# Patient Record
Sex: FEMALE | Race: BLACK OR AFRICAN AMERICAN | NOT HISPANIC OR LATINO | Employment: FULL TIME | URBAN - METROPOLITAN AREA
[De-identification: names, ages, dates, MRNs, and addresses within clinical notes are randomized per-mention and may not be internally consistent; named-entity substitution may affect disease eponyms.]

---

## 2020-12-31 ENCOUNTER — HOSPITAL ENCOUNTER (EMERGENCY)
Facility: HOSPITAL | Age: 45
Discharge: HOME | End: 2020-12-31
Attending: EMERGENCY MEDICINE | Admitting: EMERGENCY MEDICINE
Payer: COMMERCIAL

## 2020-12-31 VITALS
RESPIRATION RATE: 18 BRPM | DIASTOLIC BLOOD PRESSURE: 76 MMHG | WEIGHT: 234 LBS | SYSTOLIC BLOOD PRESSURE: 131 MMHG | HEIGHT: 65 IN | TEMPERATURE: 98.2 F | BODY MASS INDEX: 38.99 KG/M2 | OXYGEN SATURATION: 100 % | HEART RATE: 82 BPM

## 2020-12-31 DIAGNOSIS — L03.818 CELLULITIS OF OTHER SPECIFIED SITE: Primary | ICD-10-CM

## 2020-12-31 LAB
ALBUMIN SERPL BCP-MCNC: 3.8 G/DL (ref 3.5–5)
ALP SERPL-CCNC: 68 U/L (ref 46–116)
ALT SERPL W P-5'-P-CCNC: 25 U/L (ref 12–78)
ANION GAP SERPL CALCULATED.3IONS-SCNC: 11 MMOL/L (ref 4–13)
AST SERPL W P-5'-P-CCNC: 14 U/L (ref 5–45)
BASOPHILS # BLD AUTO: 0.02 THOUSANDS/ΜL (ref 0–0.1)
BASOPHILS NFR BLD AUTO: 0 % (ref 0–1)
BILIRUB SERPL-MCNC: <0.1 MG/DL (ref 0.2–1)
BUN SERPL-MCNC: 16 MG/DL (ref 5–25)
CALCIUM SERPL-MCNC: 8.9 MG/DL (ref 8.3–10.1)
CHLORIDE SERPL-SCNC: 100 MMOL/L (ref 100–108)
CO2 SERPL-SCNC: 26 MMOL/L (ref 21–32)
CREAT SERPL-MCNC: 1.06 MG/DL (ref 0.6–1.3)
EOSINOPHIL # BLD AUTO: 0.17 THOUSAND/ΜL (ref 0–0.61)
EOSINOPHIL NFR BLD AUTO: 2 % (ref 0–6)
ERYTHROCYTE [DISTWIDTH] IN BLOOD BY AUTOMATED COUNT: 14.2 % (ref 11.6–15.1)
GFR SERPL CREATININE-BSD FRML MDRD: 74 ML/MIN/1.73SQ M
GLUCOSE SERPL-MCNC: 94 MG/DL (ref 65–140)
HCT VFR BLD AUTO: 38.1 % (ref 34.8–46.1)
HGB BLD-MCNC: 12 G/DL (ref 11.5–15.4)
HOLD SPECIMEN: NORMAL
IMM GRANULOCYTES # BLD AUTO: 0.02 THOUSAND/UL (ref 0–0.2)
IMM GRANULOCYTES NFR BLD AUTO: 0 % (ref 0–2)
LYMPHOCYTES # BLD AUTO: 2.08 THOUSANDS/ΜL (ref 0.6–4.47)
LYMPHOCYTES NFR BLD AUTO: 27 % (ref 14–44)
MCH RBC QN AUTO: 26.5 PG (ref 26.8–34.3)
MCHC RBC AUTO-ENTMCNC: 31.5 G/DL (ref 31.4–37.4)
MCV RBC AUTO: 84 FL (ref 82–98)
MONOCYTES # BLD AUTO: 0.42 THOUSAND/ΜL (ref 0.17–1.22)
MONOCYTES NFR BLD AUTO: 5 % (ref 4–12)
NEUTROPHILS # BLD AUTO: 5 THOUSANDS/ΜL (ref 1.85–7.62)
NEUTS SEG NFR BLD AUTO: 66 % (ref 43–75)
NRBC BLD AUTO-RTO: 0 /100 WBCS
PLATELET # BLD AUTO: 351 THOUSANDS/UL (ref 149–390)
PMV BLD AUTO: 9.9 FL (ref 8.9–12.7)
POTASSIUM SERPL-SCNC: 3.7 MMOL/L (ref 3.5–5.3)
PROT SERPL-MCNC: 8 G/DL (ref 6.4–8.2)
RBC # BLD AUTO: 4.52 MILLION/UL (ref 3.81–5.12)
SODIUM SERPL-SCNC: 137 MMOL/L (ref 136–145)
WBC # BLD AUTO: 7.71 THOUSAND/UL (ref 4.31–10.16)

## 2020-12-31 PROCEDURE — 99284 EMERGENCY DEPT VISIT MOD MDM: CPT | Performed by: EMERGENCY MEDICINE

## 2020-12-31 PROCEDURE — 36415 COLL VENOUS BLD VENIPUNCTURE: CPT

## 2020-12-31 PROCEDURE — 99282 EMERGENCY DEPT VISIT SF MDM: CPT

## 2020-12-31 PROCEDURE — 80053 COMPREHEN METABOLIC PANEL: CPT | Performed by: EMERGENCY MEDICINE

## 2020-12-31 PROCEDURE — 85025 COMPLETE CBC W/AUTO DIFF WBC: CPT | Performed by: EMERGENCY MEDICINE

## 2020-12-31 RX ORDER — CEPHALEXIN 500 MG/1
500 CAPSULE ORAL EVERY 6 HOURS SCHEDULED
Qty: 20 CAPSULE | Refills: 0 | Status: SHIPPED | OUTPATIENT
Start: 2020-12-31 | End: 2021-01-05

## 2020-12-31 RX ORDER — CEPHALEXIN 250 MG/1
500 CAPSULE ORAL ONCE
Status: COMPLETED | OUTPATIENT
Start: 2020-12-31 | End: 2020-12-31

## 2020-12-31 RX ADMIN — CEPHALEXIN 500 MG: 250 CAPSULE ORAL at 20:11

## 2021-01-01 NOTE — ED PROVIDER NOTES
History  Chief Complaint   Patient presents with    Abscess     Patient reports right abscess 4 days ago, using 2 hot compresses for 2 days  Some drainage today, with blood  Denies NVD     HPI     45-year-old female presenting for evaluation erythema pain present to the right breast at the 3 o'clock position  She has been applying warm compresses to the area for the last 2 days and states that she noticed a small pustule around a hair follicle yesterday  She squeezed it was able to express a small amount of purulent drainage today  Denies fevers, chills, nausea, or vomiting  Reports pain over the area of erythema  She has had similar episodes in the past, all of which seem to start with erythema surrounding a hair follicle  No known history of MRSA  No nipple drainage  None       History reviewed  No pertinent past medical history  History reviewed  No pertinent surgical history  History reviewed  No pertinent family history  I have reviewed and agree with the history as documented  E-Cigarette/Vaping     E-Cigarette/Vaping Substances     Social History     Tobacco Use    Smoking status: Never Smoker    Smokeless tobacco: Never Used   Substance Use Topics    Alcohol use: No    Drug use: No       Review of Systems   Constitutional: Negative for chills and fever  HENT: Negative for congestion  Eyes: Negative for visual disturbance  Respiratory: Negative for cough and shortness of breath  Cardiovascular: Negative for chest pain and leg swelling  Gastrointestinal: Negative for abdominal pain, nausea and vomiting  Genitourinary: Negative for flank pain  Musculoskeletal: Negative for arthralgias, back pain, neck pain and neck stiffness  Skin: Negative for rash  Erythema to the right breast   Neurological: Negative for weakness, numbness and headaches  Psychiatric/Behavioral: Negative for agitation, behavioral problems and confusion         Physical Exam  Physical Exam  Constitutional:       General: She is not in acute distress  Appearance: She is well-developed  She is not diaphoretic  HENT:      Head: Normocephalic and atraumatic  Right Ear: External ear normal       Left Ear: External ear normal       Nose: Nose normal    Eyes:      Conjunctiva/sclera: Conjunctivae normal    Neck:      Musculoskeletal: Normal range of motion and neck supple  Cardiovascular:      Rate and Rhythm: Normal rate and regular rhythm  Heart sounds: Normal heart sounds  No murmur  No friction rub  No gallop  Pulmonary:      Effort: Pulmonary effort is normal  No respiratory distress  Breath sounds: Normal breath sounds  No wheezing or rales  Comments: Approximately 3 cm area of erythema to the right breast at the 3 o'clock position  No fluctuance or induration  No palpable abscess  No nipple drainage  Breasts symmetric without swelling  No dimpling  No right axillary lymphadenopathy  Abdominal:      General: Bowel sounds are normal  There is no distension  Palpations: Abdomen is soft  Tenderness: There is no abdominal tenderness  There is no guarding  Musculoskeletal: Normal range of motion  General: No deformity  Skin:     General: Skin is warm and dry  Neurological:      Mental Status: She is alert and oriented to person, place, and time  Motor: No abnormal muscle tone           Vital Signs  ED Triage Vitals [12/31/20 1654]   Temperature Pulse Respirations Blood Pressure SpO2   98 2 °F (36 8 °C) 90 16 148/63 98 %      Temp Source Heart Rate Source Patient Position - Orthostatic VS BP Location FiO2 (%)   Oral Monitor Lying Left arm --      Pain Score       8           Vitals:    12/31/20 1654 12/31/20 1912   BP: 148/63 131/76   Pulse: 90 82   Patient Position - Orthostatic VS: Lying Sitting         Visual Acuity      ED Medications  Medications   cephalexin (KEFLEX) capsule 500 mg (500 mg Oral Given 12/31/20 2011)       Diagnostic Studies  Results Reviewed     Procedure Component Value Units Date/Time    Comprehensive metabolic panel [[id number]]  (Abnormal) Collected: 12/31/20 1701    Lab Status: Final result Specimen: Blood from Arm, Right Updated: 12/31/20 1747     Sodium 137 mmol/L      Potassium 3 7 mmol/L      Chloride 100 mmol/L      CO2 26 mmol/L      ANION GAP 11 mmol/L      BUN 16 mg/dL      Creatinine 1 06 mg/dL      Glucose 94 mg/dL      Calcium 8 9 mg/dL      AST 14 U/L      ALT 25 U/L      Alkaline Phosphatase 68 U/L      Total Protein 8 0 g/dL      Albumin 3 8 g/dL      Total Bilirubin <0 10 mg/dL      eGFR 74 ml/min/1 73sq m     Narrative:      National Kidney Disease Foundation guidelines for Chronic Kidney Disease (CKD):     Stage 1 with normal or high GFR (GFR > 90 mL/min/1 73 square meters)    Stage 2 Mild CKD (GFR = 60-89 mL/min/1 73 square meters)    Stage 3A Moderate CKD (GFR = 45-59 mL/min/1 73 square meters)    Stage 3B Moderate CKD (GFR = 30-44 mL/min/1 73 square meters)    Stage 4 Severe CKD (GFR = 15-29 mL/min/1 73 square meters)    Stage 5 End Stage CKD (GFR <15 mL/min/1 73 square meters)  Note: GFR calculation is accurate only with a steady state creatinine    CBC and differential [[id number]]  (Abnormal) Collected: 12/31/20 1701    Lab Status: Final result Specimen: Blood from Arm, Right Updated: 12/31/20 1713     WBC 7 71 Thousand/uL      RBC 4 52 Million/uL      Hemoglobin 12 0 g/dL      Hematocrit 38 1 %      MCV 84 fL      MCH 26 5 pg      MCHC 31 5 g/dL      RDW 14 2 %      MPV 9 9 fL      Platelets 839 Thousands/uL      nRBC 0 /100 WBCs      Neutrophils Relative 66 %      Immat GRANS % 0 %      Lymphocytes Relative 27 %      Monocytes Relative 5 %      Eosinophils Relative 2 %      Basophils Relative 0 %      Neutrophils Absolute 5 00 Thousands/µL      Immature Grans Absolute 0 02 Thousand/uL      Lymphocytes Absolute 2 08 Thousands/µL      Monocytes Absolute 0 42 Thousand/µL      Eosinophils Absolute 0 17 Thousand/µL      Basophils Absolute 0 02 Thousands/µL                  No orders to display              Procedures  Procedures         ED Course                                           MDM  Number of Diagnoses or Management Options  Cellulitis of other specified site: new and requires workup  Diagnosis management comments: Exam consistent with cellulitis to the right breast   Possibly started from an ingrown hair as erythema surrounds a hair follicle  No palpable abscess  A bedside ultrasound performed without evidence of abscess underlying this cellulitis  Will start treatment with Keflex  No evidence of systemic infection  Patient counseled about the importance of follow-up with her doctor in 1 week to ensure resolution  Discussed recommendation for mammogram to rule out underlying malignancy  Return precautions discussed  Amount and/or Complexity of Data Reviewed  Clinical lab tests: reviewed    Patient Progress  Patient progress: stable        Disposition  Final diagnoses:   Cellulitis of other specified site     Time reflects when diagnosis was documented in both MDM as applicable and the Disposition within this note     Time User Action Codes Description Comment    12/31/2020  7:40 PM Mary Alcantar Add [V01 132] Cellulitis of other specified site       ED Disposition     ED Disposition Condition Date/Time Comment    Discharge Stable u Dec 31, 2020  7:39 PM Natalie Gant discharge to home/self care  Follow-up Information     Follow up With Specialties Details Why Contact Info Additional Information    Your primary care doctor  In 1 week Please follow-up with your doctor for breast exam and recheck in 1 week to ensure resolution of your symptoms  Your doctor may want to order a mammogram for further evaluation        [facility] Emergency Medicine  As we discussed, return to the Emergency Department for increased redness to your breast, fever, swelling, severe pain, or new or concerning symptoms  [address] [facility] [address] [phone] [facility], [address], [place], [place], [address]          Discharge Medication List as of 12/31/2020  7:41 PM      START taking these medications    Details   cephalexin (KEFLEX) 500 mg capsule Take 1 capsule (500 mg total) by mouth every 6 (six) hours for 5 days, Starting Thu 12/31/2020, Until Tue 1/5/2021, Normal           No discharge procedures on file      PDMP Review     None          ED Provider  Electronically Signed by           Camilla Atkins MD  12/31/20 9792

## 2023-02-14 ENCOUNTER — APPOINTMENT (OUTPATIENT)
Dept: URGENT CARE | Facility: MEDICAL CENTER | Age: 48
End: 2023-02-14

## 2023-03-01 ENCOUNTER — APPOINTMENT (OUTPATIENT)
Dept: URGENT CARE | Facility: MEDICAL CENTER | Age: 48
End: 2023-03-01

## 2023-03-20 ENCOUNTER — OFFICE VISIT (OUTPATIENT)
Dept: OBGYN CLINIC | Facility: HOSPITAL | Age: 48
End: 2023-03-20

## 2023-03-20 ENCOUNTER — TELEPHONE (OUTPATIENT)
Dept: OBGYN CLINIC | Facility: HOSPITAL | Age: 48
End: 2023-03-20

## 2023-03-20 VITALS
BODY MASS INDEX: 39.49 KG/M2 | HEART RATE: 82 BPM | SYSTOLIC BLOOD PRESSURE: 121 MMHG | WEIGHT: 237 LBS | HEIGHT: 65 IN | DIASTOLIC BLOOD PRESSURE: 76 MMHG

## 2023-03-20 DIAGNOSIS — S83.412A SPRAIN OF MEDIAL COLLATERAL LIGAMENT OF LEFT KNEE, INITIAL ENCOUNTER: Primary | ICD-10-CM

## 2023-03-20 DIAGNOSIS — M19.019 AC JOINT ARTHROPATHY: ICD-10-CM

## 2023-03-20 DIAGNOSIS — M25.512 ACUTE PAIN OF LEFT SHOULDER: ICD-10-CM

## 2023-03-20 RX ORDER — NAPROXEN 500 MG/1
500 TABLET ORAL 2 TIMES DAILY WITH MEALS
Qty: 60 TABLET | Refills: 5 | Status: SHIPPED | OUTPATIENT
Start: 2023-03-20

## 2023-03-20 RX ORDER — NAPROXEN 500 MG/1
TABLET ORAL
COMMUNITY
Start: 2023-03-05

## 2023-03-20 NOTE — TELEPHONE ENCOUNTER
Caller: patient     Doctor: Yonis Boucehr    Reason for call: patient is going to need a new script for naproxen-500 mg-2x a day  Patient has about 5 left   Patient uses the [facility] on [address]    Call back#: [phone]

## 2023-03-20 NOTE — TELEPHONE ENCOUNTER
Caller: patient  Doctor: Yara Kowalski    Reason for call: patient is a  and needs a work note stating she's on light duty with the following restrictions:    Can't lift arm above shoulder level  No kneeling  Wear knee brace with activity  No hard grasping with left hand     Patient can retrieve through [address]     Call back#: [phone]

## 2023-03-20 NOTE — PROGRESS NOTES
Assessment:   Diagnosis ICD-10-CM Associated Orders   1  Sprain of medial collateral ligament of left knee, initial encounter  S83 412A Ambulatory Referral to Physical Therapy      2  AC joint arthropathy  M19 019 Ambulatory Referral to Physical Therapy      3  Acute pain of left shoulder  M25 512 Ambulatory Referral to Physical Therapy          Plan:  • MRIs of the left knee and shoulder were reviewed with the patient in the office  • On exam her pain does correlate with what is seen on the MRI  • At this time is recommended to continue with the hinged knee brace, icing and start physical therapy  • A cortisone [facility] was [facility], but the patient had declined today  Recommended to start therapy for the shoulder as well  • She should continue using the naproxen 500 mg as prescribed and can add extra strength Tylenol in between if need be  • Continue with the current work restriction placed by [facility]    To do next visit:  Return in about 4 weeks (around 4/17/2023) for Left shoulder and knee  The above stated was discussed in layman's terms and the patient expressed understanding  All questions were answered to the patient's satisfaction  Scribe Attestation    I,:  Calvin Koroma am acting as a scribe while in the presence of the attending physician :       I,:  Sherri Camarena MD personally performed the services described in this documentation    as scribed in my presence :             Subjective:   Pola Palacio is a 53 y o  female who presents today for consultation for her left knee and left shoulder injury referred by [facility]  Patient reports that she had a work-related injury on 1/23/2023, where the patient was 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 when she felt a sharp pain in the shoulder, she then stepped down off the bump felt the left knee collapse medially  She feels popping in the knee   She reports that she has sharp shooting pain on the medial aspect of the left knee and is worse with weightbearing  Patient has tried Tylenol, cold and naproxen for her pain and discomfort  Sharp pain on the top if the shoulder with difficulty reaching across the body  An MRI of the left shoulder was obtained on 2/23/2023 that will be reviewed today in the office  Review of systems negative unless otherwise specified in HPI  Review of Systems   Constitutional: Negative for chills, fever and unexpected weight change  HENT: Negative for hearing loss, nosebleeds and sore throat  Eyes: Negative for pain, redness and visual disturbance  Respiratory: Negative for cough, shortness of breath and wheezing  Cardiovascular: Negative for chest pain, palpitations and leg swelling  Gastrointestinal: Negative for abdominal pain and nausea  Genitourinary: Negative for dyspareunia, dysuria and frequency  Musculoskeletal: Positive for joint swelling  Skin: Negative for rash and wound  Neurological: Negative for dizziness, numbness and headaches  Psychiatric/Behavioral: Negative for decreased concentration and suicidal ideas  The patient is not nervous/anxious  History reviewed  No pertinent past medical history  History reviewed  No pertinent surgical history  History reviewed  No pertinent family history      Social History     Occupational History   • Not on file   Tobacco Use   • Smoking status: Never   • Smokeless tobacco: Never   Substance and Sexual Activity   • Alcohol use: No   • Drug use: No   • Sexual activity: Not on file         Current Outpatient Medications:   •  naproxen (NAPROSYN) 500 mg tablet, TAKE 1 TABLET BY MOUTH TWICE A DAY AS NEEDED FOR PAIN/INFLAMMATION (TAKE WITH FOOD), Disp: , Rfl:   •  Diclofenac Sodium (VOLTAREN) 1 %, Voltaren 1 % topical gel  Apply 2 gram to affected area(s) by topical route 4 times per day (Patient not taking: Reported on 3/20/2023), Disp: , Rfl:     No Known Allergies Vitals:    03/20/23 1209   BP: 121/76   Pulse: 82       Objective:                    Left Knee Exam     Tenderness   The patient is experiencing tenderness in the MCL (lateral head of gastroc)  Range of Motion   Extension: 10 (PROM 0 with pain)   Flexion: 120 (With pain)     Tests   Varus: negative Valgus: positive (with pain)  Drawer:  Anterior - negative         Other   Erythema: absent  Sensation: normal  Pulse: present  Swelling: none  Effusion: no effusion present    Comments:  Calf is soft and non tender      Left Shoulder Exam     Tenderness   The patient is experiencing tenderness in the acromioclavicular joint  Range of Motion   Forward flexion: 150 (with pain)   Internal rotation 0 degrees: Sacrum     Muscle Strength   The patient has normal left shoulder strength  Tests   Cross arm: positive  Impingement: positive    Other   Erythema: absent  Sensation: normal  Pulse: present             Diagnostics, reviewed and taken today if performed as documented:    None performed      The attending physician has personally reviewed the pertinent films in PACS and interpretation is as follows:  MRI of the left shoulder reviewed from 2/23/2023 performed at [facility] diagnostic imaging: Severe chronic acromioclavicular arthropathy, though the age of the reactive edema is difficult to confirm, age indeterminate labral tear  MRI left knee reviewed from 2/23/2023 performed at [facility] diagnostic imaging: Acute/recent medial collateral ligament sprain and ruptured/inflamed Baker's cyst   No effusion  Procedures, if performed today:    Procedures    None performed      Portions of the record may have been created with voice recognition software  Occasional wrong word or "sound a like" substitutions may have occurred due to the inherent limitations of voice recognition software  Read the chart carefully and recognize, using context, where substitutions have occurred

## 2023-03-21 ENCOUNTER — TELEPHONE (OUTPATIENT)
Dept: OBGYN CLINIC | Facility: CLINIC | Age: 48
End: 2023-03-21

## 2023-03-29 ENCOUNTER — EVALUATION (OUTPATIENT)
Dept: PHYSICAL THERAPY | Facility: CLINIC | Age: 48
End: 2023-03-29

## 2023-03-29 DIAGNOSIS — M25.512 ACUTE PAIN OF LEFT SHOULDER: ICD-10-CM

## 2023-03-29 DIAGNOSIS — S83.412A SPRAIN OF MEDIAL COLLATERAL LIGAMENT OF LEFT KNEE, INITIAL ENCOUNTER: ICD-10-CM

## 2023-03-29 DIAGNOSIS — M19.019 AC JOINT ARTHROPATHY: ICD-10-CM

## 2023-03-29 NOTE — PROGRESS NOTES
PT Evaluation     Today's date: 3/29/2023  Patient name: Emma Mir  : 1975  MRN: [id number]  Referring provider: Bia Gaitan MD  Dx:   Encounter Diagnosis     ICD-10-CM    1  Sprain of medial collateral ligament of left knee, initial encounter  S83 412A Ambulatory Referral to Physical Therapy      2  AC joint arthropathy  M19 019 Ambulatory Referral to Physical Therapy      3  Acute pain of left shoulder  M25 512 Ambulatory Referral to Physical Therapy          Start Time: 830  Stop Time: 935  Total time in clinic (min): 65 minutes    Assessment  Assessment details: Pt is a 53y o  year old female presenting to physical therapy for Sprain of medial collateral ligament of left knee, initial encounter, AC joint arthropathy, and Acute pain of left shoulder  She presents with the following impairments: decreased L knee flexion ROM, poor L quad activation, decreased L knee strength, decreased L shoulder ROM, decreased L shoulder strength, hypersensitivity of L shoulder with activity, + L AC joint testing, and + L MCL testing affecting her function with walking, squatting, lifting, working, driving, overhead activities, dressing, cleaning, showering, and going to the gym  Pt will benefit from skilled physical therapy to address functional limitations noted in evaluation and meet patient goals  Impairments: abnormal muscle firing, abnormal or restricted ROM, activity intolerance, impaired physical strength, lacks appropriate home exercise program, pain with function and poor body mechanics    Symptom irritability: moderateBarriers to therapy: Worker's compensation  Understanding of Dx/Px/POC: good   Prognosis: good    Goals  ST  Pt will be independent with HEP  2  Pt will improve L shoulder flexion ROM by 15 degrees  3  Pt will improve L knee flexion ROM by 15 degrees  4  Pt will improve L quad activation to good  5  Pt will improve L shoulder pain at rest to 4/10  LT   Pt will "improve L shoulder pain at rest to 0/10   2  Pt will demonstrate L shoulder ROM WNL to improve ability to perform ADLs  3  Pt will improve L knee extension strength to 4+/5 to improve stability with walking  4  Pt will improve L shoulder ER strength to 4/5 to improve ability to carry objects  5  Pt will improve L shoulder abduction strength to 4/5 to improve ability to lift overhead  Plan  Patient would benefit from: PT eval and skilled physical therapy  Planned modality interventions: biofeedback, manual electrical stimulation, microcurrent electrical stimulation, TENS, electrical stimulation/Russian stimulation, thermotherapy: hydrocollator packs, cryotherapy and unattended electrical stimulation  Planned therapy interventions: abdominal trunk stabilization, joint mobilization, manual therapy, massage, ADL retraining, neuromuscular re-education, body mechanics training, patient education, postural training, strengthening, stretching, therapeutic activities, therapeutic exercise, flexibility, functional ROM exercises, home exercise program, balance and balance/weight bearing training  Frequency: 2x week  Duration in weeks: 8  Treatment plan discussed with: patient        Subjective Evaluation    History of Present Illness  Mechanism of injury: Pt presents to clinic with history of L knee MCL sprain and L shoulder pain that started after an injury at work on 1/23 where she was opening a truck container and it was stuck but she heard her L shoulder \"snap\" and then when she stepped off she felt her knee buckle  Pt stated that this is a worker's compensation case  Pt stated that she cannot lay on her L side without pain  Pt stated that she has a lot of pain when she reaches her arm across her body and overhead  Pt stated that her L knee pain has improved since the injury and the swelling has gone down  Pt stated she ices the knee everyday and it helps with the pain   Pt stated she takes naproxen for her pain, it " helps with the shoulder but not with the knee  Pt stated that she has had some pain ever since her appointment with her doctor after performing different knee special tests  Pt says that she has a popping sound of the L knee that makes the knee feel better  Pt stated that she has some difficulty performing ADLs at home such as sweeping, dressing- putting her bra on, and  showering  Quality of life: good    Pain  Current pain ratin  At best pain ratin  Quality: dull ache, discomfort, sharp and pulling          Objective     Palpation   Left   Tenderness of the rectus femoris and vastus medialis  Right   No palpable tenderness to the rectus femoris and vastus medialis  Tenderness     Left Shoulder   Tenderness in the [facility] joint and acromion  No tenderness in the biceps tendon (proximal), bicipital groove and supraspinatus tendon  Right Shoulder  No tenderness in the [facility] joint, acromion, biceps tendon (proximal), bicipital groove and supraspinatus tendon  Left Knee   Tenderness in the MCL (distal), MCL (proximal), medial joint line, patellar tendon and quadriceps tendon  No tenderness in the lateral joint line, lateral retinaculum, LCL (distal) and LCL (proximal)  Right Knee   No tenderness in the lateral joint line, LCL (distal), LCL (proximal), MCL (distal), MCL (proximal), medial joint line, patellar tendon and quadriceps tendon       Active Range of Motion   Cervical/Thoracic Spine       Cervical    Flexion:  WFL  Extension:  Restriction level: minimal  Left lateral flexion:  WFL  Right lateral flexion:  with pain Restriction level minimal  Left rotation:  WFL  Right rotation:  [facility]  Left Shoulder   Flexion: 115 degrees with pain  Abduction: 100 degrees with pain  External rotation 45°: 60 degrees   Internal rotation 45°: 85 degrees     Right Shoulder   Flexion: 175 degrees   Abduction: 170 degrees   External rotation 45°: 80 degrees   Internal rotation 45°: 90 degrees   Left Knee   Flexion: "120 degrees   Extension: 0 degrees     Right Knee   Flexion: 140 degrees   Extension: 0 degrees     Passive Range of Motion   Left Shoulder   Flexion: 120 degrees with pain  Abduction: 105 degrees with pain    Mobility   Patellar Mobility:   Left Knee   WFL: medial and lateral    Hypomobile: left superior and left inferior    Right Knee   WFL: medial, lateral, superior and inferior    Patellar Mobility Comments   Left medial tendon comments: pain  Strength/Myotome Testing     Left Shoulder     Planes of Motion   Flexion: 3+   Abduction: 3+   External rotation at 0°: 3   Internal rotation at 0°: 3+     Right Shoulder     Planes of Motion   Flexion: 4+   Abduction: 4+   External rotation at 0°: 4   Internal rotation at 0°: 4+     Left Knee   Flexion: 4+  Extension: 3+  Quadriceps contraction: poor    Right Knee   Flexion: 5  Extension: 5  Quadriceps contraction: good    Tests     Left Shoulder   Positive AC shear, apprehension, crossover, empty can, full can, Hawkin's, painful arc and ULTT1  Negative drop arm  Right Shoulder   Negative AC shear, apprehension, crossover, drop arm, empty can, full can, Hawkin's, painful arc and ULTT1  Left Knee   Positive medial Zeyad, patellar apprehension, valgus stress test at 0 degrees and valgus stress test at 30 degrees  Negative lateral Zeyad, varus stress test at 0 degrees and varus stress test at 30 degrees  Additional Tests Details  Painful arc past 90 degrees of abduction on L side  Assess single leg balance next visit  Precautions:  Worker's comp    Date 3/29            Visit # IE            FOTO 26/53             Re-eval IE              Manuals 3/29            L knee PROM              L shoulder PROM                                        Neuro Re-Ed 3/29            No monies 5x GTB            Quad sets 10x5\"            bridges             clamshells 10x GTB            SLS             Ball circles             Push up plus           " Ther Ex 3/29            Bike             UBE/pulleys             Leg press             Finger ladder             Wall slides             Shoulder raises             scap punches             Rows/exts 10x GTB rows            ER/IR             sidestepping             S/l hip abd             UT str                                        Ther Activity 3/29            squats             Step ups             Gait Training                                       Modalities 3/29            ice prn

## 2023-04-03 ENCOUNTER — OFFICE VISIT (OUTPATIENT)
Dept: PHYSICAL THERAPY | Facility: CLINIC | Age: 48
End: 2023-04-03

## 2023-04-03 DIAGNOSIS — S83.412A SPRAIN OF MEDIAL COLLATERAL LIGAMENT OF LEFT KNEE, INITIAL ENCOUNTER: Primary | ICD-10-CM

## 2023-04-03 DIAGNOSIS — M19.019 AC JOINT ARTHROPATHY: ICD-10-CM

## 2023-04-03 DIAGNOSIS — M25.512 ACUTE PAIN OF LEFT SHOULDER: ICD-10-CM

## 2023-04-03 NOTE — PROGRESS NOTES
"Daily Note     Today's date: 4/3/2023  Patient name: Marie Baker  : 1975  MRN: [id number]  Referring provider: Madhavi Walsh MD  Dx:   Encounter Diagnosis     ICD-10-CM    1  Sprain of medial collateral ligament of left knee, initial encounter  S83 412A       2  AC joint arthropathy  M19 019       3  Acute pain of left shoulder  M25 512           Start Time: 1150  Stop Time: 1300  Total time in clinic (min): 70 minutes    Subjective: Pt stated that the day after the IE she was very sore, but is feeling ok today  Pt said her R shoulder and neck feel tight today  Pt stated it was hard to complete some of the LE strengthening exercises due to pain in her back when laying on her back  Objective: See treatment diary below      Assessment: Pt tolerated warm up on pulleys well but had mild discomfort towards end range flexion of RUE; pt reported decrease in tightness post activity  Pt tolerated manual L shoulder PROM with UT stretch and L knee PROM with reported decrease in stiffness post manual treatment  Pt tolerated supine LE and shoulder strengthening exercises but required mild verbal cueing and demonstration to perform with proper form  Pt performed L shoulder mobility and strengthening exercises well but had mild to moderate discomfort during exercises  Pt would benefit from continued skilled PT to improve L shoulder mobility, strength, scapular stability, L knee strength, quad activation, mobility, and functional ability  Plan: Continue per plan of care  Progress treatment as tolerated  Precautions:  Worker's comp    Date 3/29 4/3           Visit # IE 2           FOTO              Re-eval IE              Manuals 3/29 4/3           L knee PROM   DK           L shoulder PROM   DK                                     Neuro Re-Ed 3/29 4/3           No monies 5x GTB 15x GTB           Quad sets 10x5\" 20x5\"           bridges  2x10            clamshells 10x GTB            SLS           " "  Ball circles  30x f/b           Push up plus             Ther Ex 3/29 4/3           Bike             UBE/pulleys  5' pulley           Leg press  15x LLE 45#           Finger ladder  10x5\" flex           Wall slides             Shoulder raises             Shoulder AAROM  10x5\" flex, sup baton           scap punches  2x10 2#           Rows/exts 10x GTB rows            ER/IR  2x10 3#           sidestepping             S/l hip abd             UT str   5x15\" for L side           Hamstring str  5x10\" supine           TKE             Ther Activity 3/29 4/3           squats             Step ups             Gait Training                                       Modalities 3/29 4/3           ice prn                              "

## 2023-04-05 ENCOUNTER — OFFICE VISIT (OUTPATIENT)
Dept: PHYSICAL THERAPY | Facility: CLINIC | Age: 48
End: 2023-04-05

## 2023-04-05 DIAGNOSIS — M25.512 ACUTE PAIN OF LEFT SHOULDER: ICD-10-CM

## 2023-04-05 DIAGNOSIS — S83.412A SPRAIN OF MEDIAL COLLATERAL LIGAMENT OF LEFT KNEE, INITIAL ENCOUNTER: Primary | ICD-10-CM

## 2023-04-05 DIAGNOSIS — M19.019 AC JOINT ARTHROPATHY: ICD-10-CM

## 2023-04-05 NOTE — PROGRESS NOTES
"Daily Note     Today's date: 2023  Patient name: Don Lawrence  : 1975  MRN: [id number]  Referring provider: Kristen Uriarte MD  Dx:   Encounter Diagnosis     ICD-10-CM    1  Sprain of medial collateral ligament of left knee, initial encounter  S83 412A       2  AC joint arthropathy  M19 019       3  Acute pain of left shoulder  M25 512                      Subjective: Pt presents to PT reporting increased pain in L shoulder secondary to mopping and sweeping the whole house yesterday and work up sore this morning  Pt reports minimal pain in L knee today  Pt denies increased pain post PT session  She reports positive response to CP  Objective: See treatment diary below      Assessment: Pt demonstrates poor tolerance to TE today secondary to L shoulder pain  Unable to tolerate manual therapy with increased guarding despite cuing  Added table slides to achieve ROM with minimal pain with MHP to L shoulder  Pt educated on when to use heat and cold; safety and timing  Pt reports a verbal understanding  Patient demonstrated fatigue post treatment, exhibited good technique with therapeutic exercises and would benefit from continued PT to increase flexibility, strength and function  Plan: Continue per plan of care  Precautions: Worker's comp    Date 3/29 4/3 4/5          Visit # IE 2 3          FOTO              Re-eval IE              Manuals 3/29 4/3 4/5          L knee PROM   DK np          L shoulder PROM   DK np PN!                                     Neuro Re-Ed 3/29 4/3 4/5          No monies 5x GTB 15x GTB           Quad sets 10x5\" 20x5\"           bridges  2x10            clamshells 10x GTB            SLS             Unilateral stretch @ door way   10\" x 5          Table slides   10\" x 10 flex/IR          Ball circles  30x f/b           Push up plus             Ther Ex 3/29 4/3 4/5          Bike             UBE/pulleys  5' pulley 5' pulley          Leg press  15x LLE 45# " "2x10 LLE 45#          Finger ladder  10x5\" flex 10x5\" flex          Wall slides             Shoulder raises             Shoulder AAROM  10x5\" flex, sup baton           scap punches  2x10 2#           Rows/exts 10x GTB rows            ER/IR  2x10 3#           sidestepping             S/l hip abd             UT str   5x15\" for L side           Hamstring str  5x10\" supine           TKE             Ther Activity 3/29 4/3 4/5          squats             Step ups             Gait Training   4/5                                    Modalities 3/29 4/3 4/5          ice prn  10' L shoulder          MHP   c TE 10 mins               "

## 2023-04-25 ENCOUNTER — OFFICE VISIT (OUTPATIENT)
Dept: PHYSICAL THERAPY | Facility: CLINIC | Age: 48
End: 2023-04-25

## 2023-04-25 DIAGNOSIS — S83.412A SPRAIN OF MEDIAL COLLATERAL LIGAMENT OF LEFT KNEE, INITIAL ENCOUNTER: Primary | ICD-10-CM

## 2023-04-25 DIAGNOSIS — M25.512 ACUTE PAIN OF LEFT SHOULDER: ICD-10-CM

## 2023-04-25 DIAGNOSIS — M19.019 AC JOINT ARTHROPATHY: ICD-10-CM

## 2023-04-25 NOTE — PROGRESS NOTES
Daily Note     Today's date: 2023  Patient name: Don Lawrence  : 1975  MRN: [id number]  Referring provider: Kristen Uriarte MD  Dx:   Encounter Diagnosis     ICD-10-CM    1  Sprain of medial collateral ligament of left knee, initial encounter  S83 412A       2  Acute pain of left shoulder  M25 512       3  AC joint arthropathy  M19 019           Start Time: 08  Stop Time: 905  Total time in clinic (min): 65 minutes    Subjective: Pt stated that she did not sleep very well last night due to pain in her L shoulder that shot down her whole arm  Pt also stated that her knee was in a lot of pain last night after climbing stairs and moving stuff around in preparation for her to move to a new house  Objective: See treatment diary below      Assessment: Pt tolerated warm up on pulleys well, requiring one rest break due to mild discomfort in her L shoulder during activity  Pt could not perform full set of resisted L shoulder IR due to moderate discomfort with activity  Pt tolerated addition of new L knee strengthening exercises well and would benefit from continued progression of exercises focusing on L quad activation  Pt tolerated mobility L shoulder mobility exercises well but had mild discomfort post each exercise  Pt tolerated application of ice on L shoulder and knee at end of session well with reported decrease in discomfort post modality  Pt would benefit from continued skilled PT to improve L knee strength, mobility, L shoulder strength, ROM, and functional ability  Plan: Progress note during next visit  Progress treatment as tolerated  Precautions: Worker's comp    Date 3/29 4/3 4/5 4/11 4/14 4/18 4/21 4/25     Visit # IE 2 3 4 5 6 7 8     FOTO              Re-eval IE              Manuals 3/29 4/3 4/5 4/11 4/14 4/18 4/21 4/25     L knee PROM   DK np  DK Quad/HS str  DK quad/hs str      L shoulder PROM   DK np PN!  DK w grade I-II mobs  DK w gr I-II       LLE STM     DK w "roller  DK w roller                   Neuro Re-Ed 3/29 4/3 4/5 4/11 4/14 4/18 4/21 4/25     No monies 5x GTB 15x GTB  5x GTB**  10x PTB  2x10 PTB     Quad sets 10x5\" 20x5\"     15x5\"      bridges  2x10      2x10      clamshells 10x GTB            SLS             Unilateral stretch @ door way   10\" x 5 5x15\" BUE    5x15\" BUE 5x20\"     Table slides   10\" x 10 flex/IR 10x10\" flex         Ball circles  30x f/b           Supine knee ext w leg hanging       10x       LAQ       10x 2x10     Push up plus             Ther Ex 3/29 4/3 4/5 4/11 4/14 4/18 4/21 4/25     Bike     6'        UBE/pulleys  5' pulley 5' pulley 5' pull  5' pull  5' pull     Leg press  15x LLE 45# 2x10 LLE 45#    2x10 LLE 45# 2x10 LLE 55#     Finger ladder  10x5\" flex 10x5\" flex 10x10\" flex  10x10\" flex 10x10\" flex 10x10\" flex     Wall slides             Shoulder raises             Shoulder AAROM  10x5\" flex, sup baton  2x10 5\" sup  2x10 5\"       scap punches  2x10 2#    2x10 3#       Rows/exts 10x GTB rows       2x10 10#     ER/IR  2x10 3#    2x10 3#  2x10 3# ER, IR 5x**     sidestepping             S/l hip abd             UT str   5x15\" for L side           Hamstring str  5x10\" supine   5x15\" supine        Prone LLE hang     3x30\"         TKE        2x15 10#                  Ther Activity 3/29 4/3 4/5     4/25     squats        2x10      Step ups        2x10 0R     Gait Training   4/5                                    Modalities 3/29 4/3 4/5  4/14 4/18 4/21 4/25     ice prn  10' L shoulder  8' ice 8' ice post L shoulder 8' ice post L knee 8' ice post L knee, L shoulder     MHP   c TE 10 mins 10' post              "

## 2023-04-28 ENCOUNTER — OFFICE VISIT (OUTPATIENT)
Dept: PHYSICAL THERAPY | Facility: CLINIC | Age: 48
End: 2023-04-28

## 2023-04-28 DIAGNOSIS — M19.019 AC JOINT ARTHROPATHY: ICD-10-CM

## 2023-04-28 DIAGNOSIS — M25.512 ACUTE PAIN OF LEFT SHOULDER: ICD-10-CM

## 2023-04-28 DIAGNOSIS — S83.412A SPRAIN OF MEDIAL COLLATERAL LIGAMENT OF LEFT KNEE, INITIAL ENCOUNTER: Primary | ICD-10-CM

## 2023-04-28 NOTE — PROGRESS NOTES
Daily Note     Today's date: 2023  Patient name: Geneva Jovel  : 1975  MRN: [id number]  Referring provider: Alaina De La Cruz MD  Dx:   Encounter Diagnosis     ICD-10-CM    1  Sprain of medial collateral ligament of left knee, initial encounter  S83 412A       2  Acute pain of left shoulder  M25 512       3  AC joint arthropathy  M19 019           Start Time: 08  Stop Time: 57  Total time in clinic (min): 65 minutes    Subjective: Pt stated that her knee is bothering her today and the shoulder pain is manageable  Pt stated that she is moving into a new house tomorrow night is a little nervous about how her knee and shoulder are going to be  Objective: See treatment diary below      Assessment: Pt tolerated warm up on bike well and able to perform without increase in pain during or after activity  Pt session today focused on knee extension mobility, quad activation, and stretching posterior and lateral structures of LLE, which pt tolerated with mild discomfort but demonstrated improved ability to extend knee with decreased pain  Pt tolerated manual STM with roller to HS and ITB as well as knee extension PROM with overpressure to facilitate improved motion with reported decrease in stiffness post manual treatment  Pt was challenged appropriately with SAQ exercise and showed improved quad activation with activity, progress resistance as tolerated next session  Pt tolerated application of ice  of session well with reported decrease in discomfort post modality  Pt would benefit from continued skilled PT to improve LLE mobility, strength, weight bearing tolerance, endurance, quad activation, L shoulder strength, ROM, and functional ability  Plan: Continue per plan of care  Progress treatment as tolerated  Precautions:  Worker's comp    Date 3/29 4/3 4/5 4/11 4/14 4/18 4/21 4/25 4/28    Visit # IE 2 3 4 5 6 7 8 9    FOTO              Re-eval IE              Manuals 3/29 4/3 4/5 4/11 "4/14 4/18 4/21 4/25 4/28    L knee PROM   DK np  DK Quad/HS str  DK quad/hs str  DK knee exts w overpressure    L shoulder PROM   DK np PN!  DK w grade I-II mobs  DK w gr I-II       LLE STM     DK w roller  DK w roller                   Neuro Re-Ed 3/29 4/3 4/5 4/11 4/14 4/18 4/21 4/25 4/28    No monies 5x GTB 15x GTB  5x GTB**  10x PTB  2x10 PTB     Quad sets 10x5\" 20x5\"     15x5\"  20x5\"    bridges  2x10      2x10      clamshells 10x GTB            SLS             Unilateral stretch @ door way   10\" x 5 5x15\" BUE    5x15\" BUE 5x20\"     Table slides   10\" x 10 flex/IR 10x10\" flex         Ball circles  30x f/b           Supine knee ext w leg hanging       10x       SAQ         20x5\"    LAQ       10x 2x10     Push up plus             Ther Ex 3/29 4/3 4/5 4/11 4/14 4/18 4/21 4/25 4/28    Bike     6'    6'    UBE/pulleys  5' pulley 5' pulley 5' pull  5' pull  5' pull     Leg press  15x LLE 45# 2x10 LLE 45#    2x10 LLE 45# 2x10 LLE 55# 2x10 LLE 65#    Finger ladder  10x5\" flex 10x5\" flex 10x10\" flex  10x10\" flex 10x10\" flex 10x10\" flex     Wall slides             Shoulder raises             Shoulder AAROM  10x5\" flex, sup baton  2x10 5\" sup  2x10 5\"       scap punches  2x10 2#    2x10 3#       Rows/exts 10x GTB rows       2x10 10#     ER/IR  2x10 3#    2x10 3#  2x10 3# ER, IR 5x**     sidestepping             S/l hip abd             UT str   5x15\" for L side           Hamstring str  5x10\" supine   5x15\" supine    5x20\" sup    Prone LLE hang     3x30\"         TKE        2x15 10# 2x15 10#    Seated knee ext hold         2x1' on chair    Ther Activity 3/29 4/3 4/5     4/25     squats        2x10      Step ups        2x10 0R     Gait Training   4/5                                    Modalities 3/29 4/3 4/5  4/14 4/18 4/21 4/25 4/28    ice prn  10' L shoulder  8' ice 8' ice post L shoulder 8' ice post L knee 8' ice post L knee, L shoulder 8' ice post L knee, L shoul    MHP   c TE 10 mins 10' post              "

## 2023-05-02 ENCOUNTER — APPOINTMENT (OUTPATIENT)
Dept: PHYSICAL THERAPY | Facility: CLINIC | Age: 48
End: 2023-05-02
Payer: OTHER MISCELLANEOUS

## 2023-05-03 ENCOUNTER — OFFICE VISIT (OUTPATIENT)
Dept: PHYSICAL THERAPY | Facility: CLINIC | Age: 48
End: 2023-05-03

## 2023-05-03 DIAGNOSIS — S83.412A SPRAIN OF MEDIAL COLLATERAL LIGAMENT OF LEFT KNEE, INITIAL ENCOUNTER: Primary | ICD-10-CM

## 2023-05-03 DIAGNOSIS — M25.512 ACUTE PAIN OF LEFT SHOULDER: ICD-10-CM

## 2023-05-03 DIAGNOSIS — M19.019 AC JOINT ARTHROPATHY: ICD-10-CM

## 2023-05-03 NOTE — PROGRESS NOTES
Daily Note     Today's date: 5/3/2023  Patient name: Anita Wong  : 1975  MRN: [id number]  Referring provider: Leandro Cheng MD  Dx:   Encounter Diagnosis     ICD-10-CM    1  Sprain of medial collateral ligament of left knee, initial encounter  S83 412A       2  Acute pain of left shoulder  M25 512       3  AC joint arthropathy  M19 019           Start Time: 0900  Stop Time: 09  Total time in clinic (min): 45 minutes    Subjective: Pt stated that she moved into her new house this past weekend and she was on her feet most of the weekend and has a lot of pain in her L knee today  Pt stated her shoulder is feeling ok today and would like to work on stretching out the knee  Objective: See treatment diary below      Assessment: Pt session focused on manual stretching and PROM as pt was in a lot of pain throughout session  Pt tolerated manual L knee PROM into extension with quad stretch, quad STM, and hamstring stretch with reported decrease in stiffness post manual treatment  Pt continues to demonstrate moderate to maximal tightness of L hamstring which is prohibiting full knee extension ROM and causing her moderate discomfort on the posterior aspect of the knee, pt stated that supine stretch for the hamstring has helped a little bit with her discomfort and demonstrates slight improvement in ROM with continued repetitions  Continue to work on proper quad activation technique as pt still has moderate deficit compared to RLE  Pt tolerated application of ice at end of session well with reported decrease in discomfort post modality  Pt would benefit from continued skilled PT to improve L quad activation, knee extension ROM, LLE mobility, LUE strength, ROM, and functional ability  Plan: Continue per plan of care  Progress treatment as tolerated  Focus on shoulder NV  Precautions:  Worker's comp    Date 3/29 4/3 4/5 4/11 4/14 4/18 4/21 4/25 4/28 5/3   Visit # IE 2 3 4 5 6 7 8 9 10   FOTO  "           Re-eval IE              Manuals 3/29 4/3 4/5 4/11 4/14 4/18 4/21 4/25 4/28 5/3   L knee PROM   DK np  DK Quad/HS str  DK quad/hs str  DK knee exts w overpressure DK L knee exts w overpressure, quad str, HS str   L shoulder PROM   DK np PN!  DK w grade I-II mobs  DK w gr I-II       LLE STM     DK w roller  DK w roller   DK w roller                Neuro Re-Ed 3/29 4/3 4/5 4/11 4/14 4/18 4/21 4/25 4/28 5/3   No monies 5x GTB 15x GTB  5x GTB**  10x PTB  2x10 PTB     Quad sets 10x5\" 20x5\"     15x5\"  20x5\" 20x5\"   bridges  2x10      2x10      clamshells 10x GTB            SLS             Unilateral stretch @ door way   10\" x 5 5x15\" BUE    5x15\" BUE 5x20\"     Table slides   10\" x 10 flex/IR 10x10\" flex         Ball circles  30x f/b           Supine knee ext w leg hanging       10x       SAQ         20x5\" 20x5\"   LAQ       10x 2x10  2x10   Push up plus             Ther Ex 3/29 4/3 4/5 4/11 4/14 4/18 4/21 4/25 4/28 5/3   Bike     6'    6' nv   UBE/pulleys  5' pulley 5' pulley 5' pull  5' pull  5' pull     Leg press  15x LLE 45# 2x10 LLE 45#    2x10 LLE 45# 2x10 LLE 55# 2x10 LLE 65# 3x10 65# LLE   Finger ladder  10x5\" flex 10x5\" flex 10x10\" flex  10x10\" flex 10x10\" flex 10x10\" flex     Wall slides             Shoulder raises             Shoulder AAROM  10x5\" flex, sup baton  2x10 5\" sup  2x10 5\"       scap punches  2x10 2#    2x10 3#       Rows/exts 10x GTB rows       2x10 10#     ER/IR  2x10 3#    2x10 3#  2x10 3# ER, IR 5x**     sidestepping             S/l hip abd             UT str   5x15\" for L side           Hamstring str  5x10\" supine   5x15\" supine    5x20\" sup 5x20\" sup   Prone LLE hang     3x30\"         TKE        2x15 10# 2x15 10#    Seated knee ext hold         2x1' on chair 3x1' chair   Ther Activity 3/29 4/3 4/5     4/25     squats        2x10      Step ups        2x10 0R     Gait Training   4/5                                    Modalities 3/29 4/3 4/5  4/14 4/18 4/21 4/25 4/28 5/3   ice prn  10' L " shoulder  8' ice 8' ice post L shoulder 8' ice post L knee 8' ice post L knee, L shoulder 8' ice post L knee, L shoul 8' ice post L knee, L shoulder   MHP   c TE 10 mins 10' post

## 2023-05-05 ENCOUNTER — APPOINTMENT (OUTPATIENT)
Dept: PHYSICAL THERAPY | Facility: CLINIC | Age: 48
End: 2023-05-05
Payer: OTHER MISCELLANEOUS

## 2023-05-05 ENCOUNTER — OFFICE VISIT (OUTPATIENT)
Dept: PHYSICAL THERAPY | Facility: CLINIC | Age: 48
End: 2023-05-05

## 2023-05-05 DIAGNOSIS — S83.412A SPRAIN OF MEDIAL COLLATERAL LIGAMENT OF LEFT KNEE, INITIAL ENCOUNTER: Primary | ICD-10-CM

## 2023-05-05 DIAGNOSIS — M19.019 AC JOINT ARTHROPATHY: ICD-10-CM

## 2023-05-05 DIAGNOSIS — M25.512 ACUTE PAIN OF LEFT SHOULDER: ICD-10-CM

## 2023-05-05 NOTE — PROGRESS NOTES
Daily Note     Today's date: 2023  Patient name: Roslyn Florian  : 1975  MRN: [id number]  Referring provider: Laura Rick MD  Dx:   Encounter Diagnosis     ICD-10-CM    1  Sprain of medial collateral ligament of left knee, initial encounter  S83 412A       2  Acute pain of left shoulder  M25 512       3  AC joint arthropathy  M19 019           Start Time: 99  Stop Time: 93  Total time in clinic (min): 63 minutes    Subjective: Pt stated that her knee is feeling a lot better today, although she is still having some tightness on the top of the knee  Pt stated that her shoulder is feeling ok but she slept on it last night and is feeling a little sore  Objective: See treatment diary below      Assessment: Pt tolerated warm up on pulleys well at beginning of session with observable improvement in L shoulder flexion ROM and only required one rest break during activity due to mild discomfort  Pt tolerated manual L shoulder PROM with reported decrease in stiffness post manual treatment- pt demonstrated improved shoulder flexion, abduction, and ER PROM today with mild discomfort above 120 degrees  Pt tolerated L shoulder strengthening exercises well today with mild discomfort and fatigue, continue to progress activities as tolerated  Pt performed L shoulder mobility exercises well with observable improvement in ROM  Pt showed improved tolerance to L knee extension activities as well with decreased pain  Pt would benefit from continued skilled PT to improve LUE strength, mobility, ROM, LLE strength, endurance, and functional ability  Plan: Continue per plan of care  Progress treatment as tolerated  Precautions:  Worker's comp    Date 5/5   4/11 4/14 4/18 4/21 4/25 4/28 5/3   Visit # 11   4 5 6 7 8 9 10   FOTO              Re-eval               Manuals 5/5   4/11 4/14 4/18 4/21 4/25 4/28 5/3   L knee PROM      DK Quad/HS str  DK quad/hs str  DK knee exts w overpressure DK L knee exts w "overpressure, quad str, HS str   L shoulder PROM  DK   DK w grade I-II mobs  DK w gr I-II       LLE STM     DK w roller  DK w roller   DK w roller                Neuro Re-Ed 5/5   4/11 4/14 4/18 4/21 4/25 4/28 5/3   No monies 2x10 GTB   5x GTB**  10x PTB  2x10 PTB     Quad sets       15x5\"  20x5\" 20x5\"   bridges       2x10      clamshells             SLS             Unilateral stretch @ door way 5x15\" BUE   5x15\" BUE    5x15\" BUE 5x20\"     Table slides    10x10\" flex         Ball circles 30x f/b GMB            Supine knee ext w leg hanging       10x       SAQ 20x5\"        20x5\" 20x5\"   LAQ 20x3\"      10x 2x10  2x10   Push up plus             Ther Ex 5/5 4/11 4/14 4/18 4/21 4/25 4/28 5/3   Bike     6'    6' nv   UBE/pulleys 5' pull   5' pull  5' pull  5' pull     Leg press       2x10 LLE 45# 2x10 LLE 55# 2x10 LLE 65# 3x10 65# LLE   Finger ladder 10x10\" abd   10x10\" flex  10x10\" flex 10x10\" flex 10x10\" flex     Wall slides             Shoulder raises 2x10 2# flex, 1# abd            Shoulder AAROM    2x10 5\" sup  2x10 5\"       scap punches      2x10 3#       Rows/exts        2x10 10#     S/l ER 2x10 2#            ER/IR      2x10 3#  2x10 3# ER, IR 5x**     sidestepping             S/l hip abd             UT str              Hamstring str     5x15\" supine    5x20\" sup 5x20\" sup   Prone LLE hang     3x30\"         TKE        2x15 10# 2x15 10#    Seated knee ext hold         2x1' on chair 3x1' chair   Ther Activity        4/25     squats        2x10      Step ups        2x10 0R     Gait Training                                       Modalities 5/5 4/14 4/18 4/21 4/25 4/28 5/3   ice 8' ice post     8' ice 8' ice post L shoulder 8' ice post L knee 8' ice post L knee, L shoulder 8' ice post L knee, L shoul 8' ice post L knee, L shoulder   MHP    10' post              "

## 2023-05-09 ENCOUNTER — TELEPHONE (OUTPATIENT)
Dept: OBGYN CLINIC | Facility: HOSPITAL | Age: 48
End: 2023-05-09

## 2023-05-09 ENCOUNTER — APPOINTMENT (OUTPATIENT)
Dept: PHYSICAL THERAPY | Facility: CLINIC | Age: 48
End: 2023-05-09
Payer: OTHER MISCELLANEOUS

## 2023-05-09 NOTE — TELEPHONE ENCOUNTER
Caller: Minus Holiday    Doctor: James Orozco     Reason for call: Has alight duty work note, but since she is a  the only thing that she can do on light duty is sit at a desk all day, and this is very uncomfortable. She is asking for a new work note listing restrictions such as can only sit so long at a time, or a call back to discuss further.     Call back#: [phone]

## 2023-05-11 ENCOUNTER — OFFICE VISIT (OUTPATIENT)
Dept: PHYSICAL THERAPY | Facility: CLINIC | Age: 48
End: 2023-05-11

## 2023-05-11 DIAGNOSIS — S83.412A SPRAIN OF MEDIAL COLLATERAL LIGAMENT OF LEFT KNEE, INITIAL ENCOUNTER: Primary | ICD-10-CM

## 2023-05-11 DIAGNOSIS — M25.512 ACUTE PAIN OF LEFT SHOULDER: ICD-10-CM

## 2023-05-11 DIAGNOSIS — M19.019 AC JOINT ARTHROPATHY: ICD-10-CM

## 2023-05-11 NOTE — PROGRESS NOTES
"Daily Note     Today's date: 2023  Patient name: Irlanda Odell  : 1975  MRN: [id number]  Referring provider: Florecita Cohen MD  Dx:   Encounter Diagnosis     ICD-10-CM    1  Sprain of medial collateral ligament of left knee, initial encounter  S83 412A       2  Acute pain of left shoulder  M25 512       3  AC joint arthropathy  M19 019           Start Time: 71  Stop Time: 09  Total time in clinic (min): 35 minutes    Subjective: Pt began working 40 hours a week again where she is sitting most of the day  Pt stated that her shoulder is in a lot of pain today since Tuesday where she fell asleep in the car and slept on her L shoulder, when she woke up her shoulder was in severe pain  Pt stated she told her doctor about that pain on Tuesday via phone call  Pt stated she wanted to work just on her shoulder today and to do, \"light stuff  \"       Objective: See treatment diary below      Assessment: Pt had mild to moderate discomfort with pulley activity and could not finish full five minutes due to pain  Pt performed L shoulder stretching and mobility activities well with mild discomfort during performance  Pt was able to complete ER strengthening exercises but required decreased resistance since last visit to complete with proper form  Pt tolerated manual L shoulder PROM with reported decrease in stiffness post manual treatment  Pt tolerated application of ice at end of session well with reported decrease in discomfort post modality  Pt would benefit from continued skilled PT to improve L shoulder strength, ROM, mobility, endurance, L knee strength, quad activation, flexibility, functional ability  Plan: Continue per plan of care  Progress treatment as tolerated  Precautions:  Worker's comp    Date  5/3   Visit # 11 12  4 5 6 7 8 9 10   FOTO              Re-eval               Manuals 5/5 5/11  4/11 4/14 4/18 4/21 4/25 4/28 5/3   L knee PROM      DK " "Quad/HS str  DK quad/hs str  DK knee exts w overpressure DK L knee exts w overpressure, quad str, HS str   L shoulder PROM  DK DK  DK w grade I-II mobs  DK w gr I-II       LLE STM     DK w roller  DK w roller   DK w roller                Neuro Re-Ed 5/5 5/11 4/11 4/14 4/18 4/21 4/25 4/28 5/3   No monies 2x10 GTB 2x10 PTB  5x GTB**  10x PTB  2x10 PTB     Quad sets       15x5\"  20x5\" 20x5\"   bridges       2x10      clamshells             SLS             Unilateral stretch @ door way 5x15\" BUE 5x15\" BUE  5x15\" BUE    5x15\" BUE 5x20\"     Table slides    10x10\" flex         Ball circles 30x f/b GMB            Supine knee ext w leg hanging       10x       SAQ 20x5\"        20x5\" 20x5\"   LAQ 20x3\"      10x 2x10  2x10   Push up plus             Ther Ex 5/5 5/11 4/11 4/14 4/18 4/21 4/25 4/28 5/3   Bike     6'    6' nv   UBE/pulleys 5' pull 4' pull  5' pull  5' pull  5' pull     Leg press       2x10 LLE 45# 2x10 LLE 55# 2x10 LLE 65# 3x10 65# LLE   Finger ladder 10x10\" abd 10x10\" abd  10x10\" flex  10x10\" flex 10x10\" flex 10x10\" flex     Wall slides             Shoulder raises 2x10 2# flex, 1# abd            Shoulder AAROM  2x10 5\" sup baton  2x10 5\" sup  2x10 5\"       scap punches      2x10 3#       Rows/exts        2x10 10#     S/l ER 2x10 2#            ER/IR  2x10 2# ER    2x10 3#  2x10 3# ER, IR 5x**     sidestepping             S/l hip abd             UT str              Hamstring str     5x15\" supine    5x20\" sup 5x20\" sup   Prone LLE hang     3x30\"         TKE        2x15 10# 2x15 10#    Seated knee ext hold         2x1' on chair 3x1' chair   Ther Activity        4/25     squats        2x10      Step ups        2x10 0R     Gait Training                                       Modalities 5/5 5/11   4/14 4/18 4/21 4/25 4/28 5/3   ice 8' ice post  8; post shoulder   8' ice 8' ice post L shoulder 8' ice post L knee 8' ice post L knee, L shoulder 8' ice post L knee, L shoul 8' ice post L knee, L shoulder   MHP    10' post      "

## 2023-05-12 ENCOUNTER — OFFICE VISIT (OUTPATIENT)
Dept: PHYSICAL THERAPY | Facility: CLINIC | Age: 48
End: 2023-05-12

## 2023-05-12 DIAGNOSIS — S83.412A SPRAIN OF MEDIAL COLLATERAL LIGAMENT OF LEFT KNEE, INITIAL ENCOUNTER: Primary | ICD-10-CM

## 2023-05-12 DIAGNOSIS — M25.512 ACUTE PAIN OF LEFT SHOULDER: ICD-10-CM

## 2023-05-12 DIAGNOSIS — M19.019 AC JOINT ARTHROPATHY: ICD-10-CM

## 2023-05-12 NOTE — PROGRESS NOTES
"Daily Note     Today's date: 2023  Patient name: Gregor Bliss  : 1975  MRN: [id number]  Referring provider: Jeanne Leal MD  Dx:   Encounter Diagnosis     ICD-10-CM    1  Sprain of medial collateral ligament of left knee, initial encounter  S83 412A       2  Acute pain of left shoulder  M25 512       3  AC joint arthropathy  M19 019           Start Time:   Stop Time: 192  Total time in clinic (min): 40 minutes    Subjective: Pt stated that she has been working 8 hour shifts and going in early so she is very tired today and still feels a lot of pain and stiffness in her L shoulder, her knee still feels tight but is walking around much better at home  Objective: See treatment diary below      Assessment: Pt tolerated gentle L shoulder mobility activities well today with mild discomfort during performance  Pt tolerated very slight progression of resistance with standing ER at [facility] but required mild verbal cueing to perform with proper form  Pt tolerated manual L shoulder PROM with reported decrease in stiffness post manual treatment  Pt tolerated application of ice at end of session well with reported decrease in discomfort post modality  Pt would benefit from continued skilled PT to improve L shoulder mobility, strength, L knee strength, mobility, quad activation, and functional ability  Plan: Continue per plan of care  Progress treatment as tolerated  Precautions:  Worker's comp    Date 5/5 5/11 5/12    4/21 4/25 4/28 5/3   Visit # 11 12 13    7 8 9 10   FOTO             Re-eval               Manuals  5/3   L knee PROM        DK quad/hs str  DK knee exts w overpressure DK L knee exts w overpressure, quad str, HS str   L shoulder PROM  DK DK DK          LLE STM       DK w roller   DK w roller                Neuro Re-Ed 5/5 5/11 5/12    4/21 4/25 4/28 5/3   No monies 2x10 GTB 2x10 PTB 2x10 PTB     2x10 PTB     Quad sets       15x5\"  " "20x5\" 20x5\"   bridges       2x10      clamshells             SLS             Unilateral stretch @ door way 5x15\" BUE 5x15\" BUE 5x15\" BUE    5x15\" BUE 5x20\"     Table slides   10x5\" flex          Ball circles 30x f/b GMB            Supine knee ext w leg hanging       10x       SAQ 20x5\"        20x5\" 20x5\"   LAQ 20x3\"      10x 2x10  2x10   Push up plus             Ther Ex 5/5 5/11 5/12 4/21 4/25 4/28 5/3   Bike         6' nv   UBE/pulleys 5' pull 4' pull      5' pull     Leg press       2x10 LLE 45# 2x10 LLE 55# 2x10 LLE 65# 3x10 65# LLE   Finger ladder 10x10\" abd 10x10\" abd     10x10\" flex 10x10\" flex     Wall slides             Shoulder raises 2x10 2# flex, 1# abd            Shoulder AAROM  2x10 5\" sup baton 2x10 5\" stand abd          scap punches             Rows/exts        2x10 10#     S/l ER 2x10 2#            ER/IR  2x10 2# ER 25x 2 5# ER     2x10 3# ER, IR 5x**     sidestepping             S/l hip abd             UT str              Hamstring str         5x20\" sup 5x20\" sup   Prone LLE hang             TKE        2x15 10# 2x15 10#    Seated knee ext hold         2x1' on chair 3x1' chair   Ther Activity        4/25     squats        2x10      Step ups        2x10 0R     Gait Training                                       Modalities 5/5 5/11 5/12 4/21 4/25 4/28 5/3   ice 8' ice post  8; post shoulder 8' post shoulder    8' ice post L knee 8' ice post L knee, L shoulder 8' ice post L knee, L shoul 8' ice post L knee, L shoulder   MHP                  "

## 2023-05-16 ENCOUNTER — OFFICE VISIT (OUTPATIENT)
Dept: PHYSICAL THERAPY | Facility: CLINIC | Age: 48
End: 2023-05-16

## 2023-05-16 DIAGNOSIS — M19.019 AC JOINT ARTHROPATHY: ICD-10-CM

## 2023-05-16 DIAGNOSIS — S83.412A SPRAIN OF MEDIAL COLLATERAL LIGAMENT OF LEFT KNEE, INITIAL ENCOUNTER: Primary | ICD-10-CM

## 2023-05-16 DIAGNOSIS — M25.512 ACUTE PAIN OF LEFT SHOULDER: ICD-10-CM

## 2023-05-16 NOTE — PROGRESS NOTES
Daily Note     Today's date: 2023  Patient name: Chelsi Klein  : 1975  MRN: [id number]  Referring provider: Shannan Chahal MD  Dx:   Encounter Diagnosis     ICD-10-CM    1  Sprain of medial collateral ligament of left knee, initial encounter  S83 412A       2  Acute pain of left shoulder  M25 512       3  AC joint arthropathy  M19 019           Start Time: 0800  Stop Time: 0840  Total time in clinic (min): 40 minutes    Subjective: Pt stated that her shoulder is in a lot of pain today, she has not been sleeping well the past few nights because of it  Pt stated that her knee feels like it is pretty stiff today but is feeling better overall than it has  Pt stated she wanted it to take it easy today  Objective: See treatment diary below      Assessment: Pt tolerated warm up on pulleys but had mild discomfort during activity  Pt tolerated manual L knee PROM and L shoulder PROM with reported decrease in stiffness post manual treatment but mild discomfort during manual treatment in the posterior knee and anterior shoulder  Pt had increased discomfort with L shoulder strengthening exercises and gentle mobility exercises when coming down during flexion activities  Pt would benefit from continued skilled PT to improve L knee strength, L shoulder strength, L shoulder ROM, L knee ROM, and functional ability  Plan: Continue per plan of care  Progress treatment as tolerated  Precautions:  Worker's comp    Date  53   Visit # 11 12 13 14   7 8 9 10   FOTO             Re-eval               Manuals  53   L knee PROM     DK   DK quad/hs str  DK knee exts w overpressure DK L knee exts w overpressure, quad str, HS str   L shoulder PROM  DK DK DK DK         LLE STM       DK w roller   DK w roller                Neuro Re-Ed  53   No monies 2x10 GTB 2x10 PTB 2x10 PTB 2x10 PTB    2x10 PTB     Quad "sets       15x5\"  20x5\" 20x5\"   bridges       2x10      clamshells             SLS             Unilateral stretch @ door way 5x15\" BUE 5x15\" BUE 5x15\" BUE 5x20\" BUE   5x15\" BUE 5x20\"     Table slides   10x5\" flex 10x5\" flex         Ball circles 30x f/b GMB            Supine knee ext w leg hanging       10x       SAQ 20x5\"        20x5\" 20x5\"   LAQ 20x3\"      10x 2x10  2x10   Push up plus             Ther Ex 5/5 5/11 5/12 5/16   4/21 4/25 4/28 5/3   Bike         6' nv   UBE/pulleys 5' pull 4' pull  5' pull    5' pull     Leg press       2x10 LLE 45# 2x10 LLE 55# 2x10 LLE 65# 3x10 65# LLE   Finger ladder 10x10\" abd 10x10\" abd     10x10\" flex 10x10\" flex     Wall slides             Shoulder raises 2x10 2# flex, 1# abd            Shoulder AAROM  2x10 5\" sup baton 2x10 5\" stand abd 2x10 supine baton         scap punches             Rows/exts        2x10 10#     S/l ER 2x10 2#            ER/IR  2x10 2# ER 25x 2 5# ER     2x10 3# ER, IR 5x**     sidestepping             S/l hip abd             UT str              Hamstring str         5x20\" sup 5x20\" sup   Prone LLE hang             TKE        2x15 10# 2x15 10#    Seated knee ext hold         2x1' on chair 3x1' chair   Ther Activity        4/25     squats        2x10      Step ups        2x10 0R     Gait Training                                       Modalities 5/5 5/11 5/12 5/16 4/21 4/25 4/28 5/3   ice 8' ice post  8; post shoulder 8' post shoulder 8' post shoulder/knee   8' ice post L knee 8' ice post L knee, L shoulder 8' ice post L knee, L shoul 8' ice post L knee, L shoulder   MHP                  "

## 2023-05-25 ENCOUNTER — OFFICE VISIT (OUTPATIENT)
Dept: PHYSICAL THERAPY | Facility: CLINIC | Age: 48
End: 2023-05-25

## 2023-05-25 DIAGNOSIS — M25.512 ACUTE PAIN OF LEFT SHOULDER: ICD-10-CM

## 2023-05-25 DIAGNOSIS — M19.019 AC JOINT ARTHROPATHY: ICD-10-CM

## 2023-05-25 DIAGNOSIS — S83.412A SPRAIN OF MEDIAL COLLATERAL LIGAMENT OF LEFT KNEE, INITIAL ENCOUNTER: Primary | ICD-10-CM

## 2023-05-25 NOTE — PROGRESS NOTES
Daily Note     Today's date: 2023  Patient name: Chrissy Jesus  : 1975  MRN: [id number]  Referring provider: Yelitza Hendrickson MD  Dx:   Encounter Diagnosis     ICD-10-CM    1  Sprain of medial collateral ligament of left knee, initial encounter  S83 412A       2  Acute pain of left shoulder  M25 512       3  AC joint arthropathy  M19 019           Start Time: 0900  Stop Time: 1005  Total time in clinic (min): 65 minutes    Subjective: Pt stated that her knee is feeling pretty good, but it still gets stiff sometimes and has had difficulty with higher steps to get into her truck  Pt stated that her shoulder is difficult and feels weak when she tries to do some activities overhead  Pt stated that the thing that has really been bothering her is her R ankle and stated it is hard to walk on it  Objective: See treatment diary below      Assessment: Pt had mild discomfort with warm up on pulleys but reported slight decrease in stiffness post activity  Pt had mild difficulty with resisted abduction exercises and required decreased weight compared to resisted shoulder flexion activities  Pt tolerated shoulder mobility exercises well but continues to have mild discomfort when moving towards and past 90 degrees of flexion and abduction  Pt shows good improvement of L quad activation and LLE mobility and would benefit from continued focus on extension strength and hamstring flexibility  Pt tolerated application of ice at end of session well with reported decrease in discomfort post modality  Pt would benefit from continued skilled PT to improve L shoulder strength, ROM, mobility, LLE strength, quad activation, and functional ability  Plan: Continue per plan of care  Progress treatment as tolerated  Precautions:  Worker's comp    Date 5/5 5/11 5/12 5/16 5/25  4/21 4/25 4/28 5/3   Visit # 90 35 40 89 38  6 5 5 42   YPCX 55/55            Re-eval               Manuals  "4/25 4/28 5/3   L knee PROM     DK   DK quad/hs str  DK knee exts w overpressure DK L knee exts w overpressure, quad str, HS str   L shoulder PROM  DK DK DK DK         LLE STM       DK w roller   DK w roller                Neuro Re-Ed 5/5 5/11 5/12 5/16 5/25  4/21 4/25 4/28 5/3   No monies 2x10 GTB 2x10 PTB 2x10 PTB 2x10 PTB 2x10 PTB   2x10 PTB     Quad sets       15x5\"  20x5\" 20x5\"   bridges       2x10      clamshells             SLS             Unilateral stretch @ door way 5x15\" BUE 5x15\" BUE 5x15\" BUE 5x20\" BUE 5x20\" BUE  5x15\" BUE 5x20\"     Table slides   10x5\" flex 10x5\" flex         Ball circles 30x f/b GMB            Supine knee ext w leg hanging       10x       SAQ 20x5\"        20x5\" 20x5\"   LAQ 20x3\"      10x 2x10  2x10   Push up plus             Ther Ex 5/5 5/11 5/12 5/16 5/25  4/21 4/25 4/28 5/3   Bike         6' nv   UBE/pulleys 5' pull 4' pull  5' pull 5' pull   5' pull     Leg press       2x10 LLE 45# 2x10 LLE 55# 2x10 LLE 65# 3x10 65# LLE   Finger ladder 10x10\" abd 10x10\" abd   10x10\" abd  10x10\" flex 10x10\" flex     Wall slides             Shoulder raises 2x10 2# flex, 1# abd    10x 3# flex, 2# abd        Shoulder AAROM  2x10 5\" sup baton 2x10 5\" stand abd 2x10 supine baton 10x stand 3# baton        scap punches     15x 3#        Rows/exts        2x10 10#     S/l ER 2x10 2#            ER/IR  2x10 2# ER 25x 2 5# ER  2x10 3# ER   2x10 3# ER, IR 5x**     sidestepping             S/l hip abd             UT str              Hamstring str         5x20\" sup 5x20\" sup   Prone LLE hang             TKE      2x15 12#   2x15 10# 2x15 10#    Seated knee ext hold         2x1' on chair 3x1' chair   Ther Activity     5/25 4/25     squats        2x10      Step ups     15x 2R, 15x 3R L up   2x10 0R     Gait Training                                       Modalities 5/5 5/11 5/12 5/16 5/25  4/21 4/25 4/28 5/3   ice 8' ice post  8; post shoulder 8' post shoulder 8' post shoulder/knee 8' post shoulder   8' ice post L " knee 8' ice post L knee, L shoulder 8' ice post L knee, L shoul 8' ice post L knee, L shoulder   P

## 2023-05-30 ENCOUNTER — OFFICE VISIT (OUTPATIENT)
Dept: PHYSICAL THERAPY | Facility: CLINIC | Age: 48
End: 2023-05-30

## 2023-05-30 DIAGNOSIS — M25.512 ACUTE PAIN OF LEFT SHOULDER: ICD-10-CM

## 2023-05-30 DIAGNOSIS — S83.412A SPRAIN OF MEDIAL COLLATERAL LIGAMENT OF LEFT KNEE, INITIAL ENCOUNTER: Primary | ICD-10-CM

## 2023-05-30 DIAGNOSIS — M19.019 AC JOINT ARTHROPATHY: ICD-10-CM

## 2023-05-30 NOTE — PROGRESS NOTES
Daily Note     Today's date: 2023  Patient name: Dirk Salcedo  : 1975  MRN: [id number]  Referring provider: Ghazal White MD  Dx:   Encounter Diagnosis     ICD-10-CM    1  Sprain of medial collateral ligament of left knee, initial encounter  S83 412A       2  Acute pain of left shoulder  M25 512       3  AC joint arthropathy  M19 019           Start Time: 830  Stop Time: 920  Total time in clinic (min): 50 minutes    Subjective: Pt stated that her L shoulder has been really bothering her over the weekend due to needing to do a lot of things with her family for the holiday  She stated that her knee is feeling stiff but overall is better than it was  Objective: See treatment diary below      Assessment: Pt tolerated manual L shoulder and L knee PROM with reported decrease in stiffness of knee post manual treatment but no change in L shoulder stiffness and had mild discomfort post manual treatment  Discussed with pt that the knee has seen some good benefit from PT but there has not been much functional progress with the shoulder because of the amount of pain experienced with motion to 90 degrees, recommended discussion with doctor next week in terms of different options for pain management in order to better tolerate strengthening activities  Pt tolerated application of ice at end of session well with reported decrease in discomfort post modality  Pt would benefit from continued skilled PT to improve quad activation, hamstrings flexibility, LUE mobility, strength, and functional ability  Plan: Continue per plan of care  Progress treatment as tolerated  Precautions:  Worker's comp    Date 5/5 5/11 5/12 5/16 5/25 5/30    5/3   Visit # 07 79 53 33 49 54    94   WLIM             Re-eval               Manuals 5/5 5/11 5/12 5/16 5/25 5/30    5/3   L knee PROM     DK  DK    DK L knee exts w overpressure, quad str, HS str   L shoulder PROM  DK DK DK DK  DK       LLE STM "DK w roller                Neuro Re-Ed 5/5 5/11 5/12 5/16 5/25 5/30    5/3   No monies 2x10 GTB 2x10 PTB 2x10 PTB 2x10 PTB 2x10 PTB 2x10 PTB       Quad sets          20x5\"   bridges             clamshells             SLS             Unilateral stretch @ door way 5x15\" BUE 5x15\" BUE 5x15\" BUE 5x20\" BUE 5x20\" BUE        Table slides   10x5\" flex 10x5\" flex         Ball circles 30x f/b GMB     30x f/b RMB       Supine knee ext w leg hanging             SAQ 20x5\"     20x5\"    20x5\"   LAQ 20x3\"         2x10   Push up plus             Ther Ex 5/5 5/11 5/12 5/16 5/25 5/30    5/3   Bike          nv   UBE/pulleys 5' pull 4' pull  5' pull 5' pull        Leg press          3x10 65# LLE   Finger ladder 10x10\" abd 10x10\" abd   10x10\" abd        Wall slides             Shoulder raises 2x10 2# flex, 1# abd    10x 3# flex, 2# abd        Shoulder AAROM  2x10 5\" sup baton 2x10 5\" stand abd 2x10 supine baton 10x stand 3# baton 15x sup baton       scap punches     15x 3#        Rows/exts             S/l ER 2x10 2#            ER/IR  2x10 2# ER 25x 2 5# ER  2x10 3# ER 2x15 BTB ER       sidestepping             S/l hip abd             UT str              Hamstring str          5x20\" sup   Prone LLE hang             TKE      2x15 12#        Seated knee ext hold          3x1' chair   Ther Activity     5/25        squats             Step ups     15x 2R, 15x 3R L up        Gait Training                                       Modalities 5/5 5/11 5/12 5/16 5/25 5/30    5/3   ice 8' ice post  8; post shoulder 8' post shoulder 8' post shoulder/knee 8' post shoulder  8' post shoulder/knee    8' ice post L knee, L shoulder   MHP                  "

## 2023-06-01 ENCOUNTER — OFFICE VISIT (OUTPATIENT)
Dept: PHYSICAL THERAPY | Facility: CLINIC | Age: 48
End: 2023-06-01

## 2023-06-01 DIAGNOSIS — M25.512 ACUTE PAIN OF LEFT SHOULDER: ICD-10-CM

## 2023-06-01 DIAGNOSIS — M19.019 AC JOINT ARTHROPATHY: ICD-10-CM

## 2023-06-01 DIAGNOSIS — S83.412A SPRAIN OF MEDIAL COLLATERAL LIGAMENT OF LEFT KNEE, INITIAL ENCOUNTER: Primary | ICD-10-CM

## 2023-06-01 NOTE — PROGRESS NOTES
Daily Note     Today's date: 2023  Patient name: Supa Villa  : 1975  MRN: [id number]  Referring provider: Robert Becker MD  Dx:   Encounter Diagnosis     ICD-10-CM    1  Sprain of medial collateral ligament of left knee, initial encounter  S83 412A       2  Acute pain of left shoulder  M25 512       3  AC joint arthropathy  M19 019           Start Time: 830  Stop Time: 566  Total time in clinic (min): 35 minutes    Subjective: Pt stated her knee has been pretty stiff for the past couple days and her shoulder has not changed much, stated she would like to work more on her knee today  Objective: See treatment diary below      Assessment: Pt showed good improvement in L knee extension strength and quad activation during exercises throughout session  Pt also showed improved tolerance of knee extension activities with decreased stiffness compared to last visit but continues to have increased L hamstring tightness that causes the pt discomfort with prolonged extension stretches and exercises  Pt tolerated pulley activity for L shoulder but had mild discomfort during and after activity  Pt tolerated application of ice at end of session well with reported decrease in discomfort post modality  Pt would benefit from continued skilled PT to improve L shoulder mobility, strength, ROM, L knee strength, quad activation, mobility, and functional ability  Plan: Continue per plan of care  Progress treatment as tolerated  Precautions:  Worker's comp    Date 5/5 5/11 5/12 5/16 5/25 5/30 6/1   5/3   Visit # 42 20 14 73 33 52 96   04   XKOS             Re-eval               Manuals 5/5 5/11 5/12 5/16 5/25 5/30 6/1   5/3   L knee PROM     DK  DK    DK L knee exts w overpressure, quad str, HS str   L shoulder PROM  DK DK DK DK  DK       LLE STM          DK w roller                Neuro Re-Ed 5/5 5/11 5/12 5/16 5/25 5/30 6/1   5/3   No monies 2x10 GTB 2x10 PTB 2x10 PTB 2x10 PTB 2x10 PTB 2x10 PTB "     Quad sets          20x5\"   bridges             clamshells             SLS             Unilateral stretch @ door way 5x15\" BUE 5x15\" BUE 5x15\" BUE 5x20\" BUE 5x20\" BUE        Table slides   10x5\" flex 10x5\" flex         Ball circles 30x f/b GMB     30x f/b RMB       Supine knee ext w leg hanging             SAQ 20x5\"     20x5\" 20x5\" 2#   20x5\"   LAQ 20x3\"      20x5\" 2#   2x10   Push up plus             Ther Ex 5/5 5/11 5/12 5/16 5/25 5/30 6/1   5/3   Bike          nv   UBE/pulleys 5' pull 4' pull  5' pull 5' pull  4' pull      Leg press       3x10 75# LLE   3x10 65# LLE   Finger ladder 10x10\" abd 10x10\" abd   10x10\" abd        Wall slides             Shoulder raises 2x10 2# flex, 1# abd    10x 3# flex, 2# abd        Shoulder AAROM  2x10 5\" sup baton 2x10 5\" stand abd 2x10 supine baton 10x stand 3# baton 15x sup baton       scap punches     15x 3#        Rows/exts             S/l ER 2x10 2#            ER/IR  2x10 2# ER 25x 2 5# ER  2x10 3# ER 2x15 BTB ER       sidestepping             S/l hip abd             UT str              Hamstring str       5x20\" supine   5x20\" sup   Prone LLE hang             TKE      2x15 12#  2x15 15#      Seated knee ext hold       2x20 5\" chair with quad sets   3x1' chair   Ther Activity     5/25        squats             Step ups     15x 2R, 15x 3R L up        Gait Training                                       Modalities 5/5 5/11 5/12 5/16 5/25 5/30 6/1   5/3   ice 8' ice post  8; post shoulder 8' post shoulder 8' post shoulder/knee 8' post shoulder  8' post shoulder/knee 8' post shoulder/knee   8' ice post L knee, L shoulder   MHP                  "

## 2023-06-05 ENCOUNTER — OFFICE VISIT (OUTPATIENT)
Dept: OBGYN CLINIC | Facility: HOSPITAL | Age: 48
End: 2023-06-05
Payer: OTHER MISCELLANEOUS

## 2023-06-05 VITALS
BODY MASS INDEX: 38.89 KG/M2 | HEART RATE: 99 BPM | WEIGHT: 233.4 LBS | SYSTOLIC BLOOD PRESSURE: 129 MMHG | HEIGHT: 65 IN | DIASTOLIC BLOOD PRESSURE: 83 MMHG

## 2023-06-05 DIAGNOSIS — S83.412D SPRAIN OF MEDIAL COLLATERAL LIGAMENT OF LEFT KNEE, SUBSEQUENT ENCOUNTER: ICD-10-CM

## 2023-06-05 DIAGNOSIS — M25.871 MASS OF JOINT OF RIGHT FOOT: Primary | ICD-10-CM

## 2023-06-05 DIAGNOSIS — M19.011 ARTHRITIS OF RIGHT ACROMIOCLAVICULAR JOINT: ICD-10-CM

## 2023-06-05 PROBLEM — S83.412A MCL SPRAIN OF LEFT KNEE: Status: ACTIVE | Noted: 2023-06-05

## 2023-06-05 PROCEDURE — 20610 DRAIN/INJ JOINT/BURSA W/O US: CPT | Performed by: ORTHOPAEDIC SURGERY

## 2023-06-05 PROCEDURE — 99213 OFFICE O/P EST LOW 20 MIN: CPT | Performed by: ORTHOPAEDIC SURGERY

## 2023-06-05 RX ORDER — BUPIVACAINE HYDROCHLORIDE 2.5 MG/ML
4 INJECTION, SOLUTION INFILTRATION; PERINEURAL
Status: COMPLETED | OUTPATIENT
Start: 2023-06-05 | End: 2023-06-05

## 2023-06-05 RX ORDER — TRIAMCINOLONE ACETONIDE 40 MG/ML
20 INJECTION, SUSPENSION INTRA-ARTICULAR; INTRAMUSCULAR
Status: COMPLETED | OUTPATIENT
Start: 2023-06-05 | End: 2023-06-05

## 2023-06-05 RX ADMIN — TRIAMCINOLONE ACETONIDE 20 MG: 40 INJECTION, SUSPENSION INTRA-ARTICULAR; INTRAMUSCULAR at 14:45

## 2023-06-05 RX ADMIN — BUPIVACAINE HYDROCHLORIDE 4 ML: 2.5 INJECTION, SOLUTION INFILTRATION; PERINEURAL at 14:45

## 2023-06-05 NOTE — PROGRESS NOTES
"Assessment/Plan:    Mass of joint of right foot  Were going to get an MRI of her foot to better understand what is going on with this mass  Arthritis of right acromioclavicular joint  I injected her shoulder as described below  Were going to keep her in physical therapy  MCL sprain of left knee  She will continue physical therapy for her knee  Diagnoses and all orders for this visit:    Mass of joint of right foot  -     MRI ankle/heel right  wo contrast; Future    Arthritis of right acromioclavicular joint  -     Large joint arthrocentesis: L subacromial bursa    Sprain of medial collateral ligament of left knee, subsequent encounter          Subjective:      Patient ID: Blanca Reardon is a 53 y o  female  This is a 27-year-old woman who has this ongoing shoulder and knee problems  She also has this expanding mass on the lateral aspect of her posterior lateral right foot  She brought this up last time but it certainly much bigger now  She did have outside MRI of her knee and shoulder which did not demonstrate any surgical pathology  She states that she is not getting any better and still having a lot of pain both in the lateral aspect of the knee and of the whole shoulder  She states that when she goes to work they do not have her do anything and she just wants to be taking completely off of work  The following portions of the patient's history were reviewed and updated as appropriate: allergies, current medications, past family history, past medical history, past social history, past surgical history, and problem list     Review of Systems      Objective:      /83   Pulse 99   Ht 5' 5\" (1 651 m)   Wt 106 kg (233 lb 6 4 oz)   BMI 38 84 kg/m²          Physical Exam      On physical examination she has full active and passive range of motion of the shoulder but with pain  She has giveaway when testing her rotator cuff  Her alignment looks good    There is no sign of any " problems  On examination of her foot she has this very large mass in the posterior lateral aspect of her foot behind her peroneals and anterior to her Achilles  This is tender to palpation  There is no erythema induration or fluctuance  This is a soft mass  Large joint arthrocentesis: L subacromial bursa  Universal Protocol:  Consent: Verbal consent obtained  Written consent not obtained    Consent given by: patient  Timeout called at: 6/5/2023 3:26 PM   Patient understanding: patient states understanding of the procedure being performed  Patient identity confirmed: verbally with patient    Supporting Documentation  Indications: pain   Procedure Details  Location: shoulder - L subacromial bursa  Needle size: 22 G  Approach: posterolateral  Medications administered: 4 mL bupivacaine 0 25 %; 20 mg triamcinolone acetonide 40 mg/mL

## 2023-06-06 ENCOUNTER — APPOINTMENT (OUTPATIENT)
Dept: PHYSICAL THERAPY | Facility: CLINIC | Age: 48
End: 2023-06-06
Payer: OTHER MISCELLANEOUS

## 2023-06-06 ENCOUNTER — TELEPHONE (OUTPATIENT)
Dept: OBGYN CLINIC | Facility: HOSPITAL | Age: 48
End: 2023-06-06

## 2023-06-06 NOTE — TELEPHONE ENCOUNTER
Caller: [address]    Doctor/OfficeCharisabela Barragan    CB#: [phone]      What needs to be faxed: Denice Wakefield 6/5/23      Fax#: [phone]      Documents were successfully e-faxed

## 2023-06-08 ENCOUNTER — OFFICE VISIT (OUTPATIENT)
Dept: PHYSICAL THERAPY | Facility: CLINIC | Age: 48
End: 2023-06-08
Payer: OTHER MISCELLANEOUS

## 2023-06-08 DIAGNOSIS — M25.512 ACUTE PAIN OF LEFT SHOULDER: ICD-10-CM

## 2023-06-08 DIAGNOSIS — S83.412A SPRAIN OF MEDIAL COLLATERAL LIGAMENT OF LEFT KNEE, INITIAL ENCOUNTER: Primary | ICD-10-CM

## 2023-06-08 DIAGNOSIS — M19.019 AC JOINT ARTHROPATHY: ICD-10-CM

## 2023-06-08 PROCEDURE — 97010 HOT OR COLD PACKS THERAPY: CPT

## 2023-06-08 PROCEDURE — 97110 THERAPEUTIC EXERCISES: CPT

## 2023-06-08 PROCEDURE — 97112 NEUROMUSCULAR REEDUCATION: CPT

## 2023-06-08 NOTE — PROGRESS NOTES
"Daily Note     Today's date: 2023  Patient name: Va Chávez  : 1975  MRN: [id number]  Referring provider: Abel Griffin MD  Dx:   Encounter Diagnosis     ICD-10-CM    1  Sprain of medial collateral ligament of left knee, initial encounter  S83 412A       2  Acute pain of left shoulder  M25 512       3  AC joint arthropathy  M19 019           Start Time: 08  Stop Time: 0900  Total time in clinic (min): 55 minutes    Subjective: Pt presents to PT stating he had her injection on Monday, for her shoulder  Pt states it continues to bother her especially when she presses down  Pt states she feels the knee is very stiff at the moment  Pt states she would like to focus on the knee today  Objective: See treatment diary below      Assessment: Pt showed good improvement in L knee extension strength and quad activation during exercises throughout session  Pt continues to work towards goals of increased knee strength and flexibility  Pt tolerated application of ice at end of session well with reported decrease in discomfort post modality  Pt would benefit from continued skilled PT to improve L shoulder mobility, strength, ROM, L knee strength, quad activation, mobility, and functional ability  Continue to progress as able  Plan: Continue per plan of care  Progress treatment as tolerated  Precautions:  Worker's comp    Date      Visit # 08 98 56 39 10 63 64 96     FOTO 52/53            Re-eval               Manuals      L knee PROM     DK  DK       L shoulder PROM  DK DK DK DK  DK       LLE STM                          Neuro Re-Ed      No monies 2x10 GTB 2x10 PTB 2x10 PTB 2x10 PTB 2x10 PTB 2x10 PTB       Quad sets             bridges             clamshells             SLS             Unilateral stretch @ door way 5x15\" BUE 5x15\" BUE 5x15\" BUE 5x20\" BUE 5x20\" BUE        Table slides   " "10x5\" flex 10x5\" flex         Ball circles 30x f/b GMB     30x f/b RMB       Supine knee ext w leg hanging             SAQ 20x5\"     20x5\" 20x5\" 2# 20x5\" 2#     LAQ 20x3\"      20x5\" 2# 20x5\" 2#     Push up plus             Ther Ex 5/5 5/11 5/12 5/16 5/25 5/30 6/1 6/8     Bike        6'     UBE/pulleys 5' pull 4' pull  5' pull 5' pull  4' pull np     Leg press       3x10 75# LLE 3x10 75# LLE     Finger ladder 10x10\" abd 10x10\" abd   10x10\" abd        Wall slides             Shoulder raises 2x10 2# flex, 1# abd    10x 3# flex, 2# abd        Shoulder AAROM  2x10 5\" sup baton 2x10 5\" stand abd 2x10 supine baton 10x stand 3# baton 15x sup baton       scap punches     15x 3#        Rows/exts             S/l ER 2x10 2#            ER/IR  2x10 2# ER 25x 2 5# ER  2x10 3# ER 2x15 BTB ER       sidestepping             S/l hip abd             UT str              Hamstring str       5x20\" supine 5x20\" supine     Prone LLE hang             TKE      2x15 12#  2x15 15# 2x15 15#     Seated knee ext hold       2x20 5\" chair with quad sets 2x20 5\" chair with quad sets     Ther Activity     5/25        squats             Step ups     15x 2R, 15x 3R L up        Gait Training                                       Modalities 5/5 5/11 5/12 5/16 5/25 5/30 6/1 6/8     ice 8' ice post  8; post shoulder 8' post shoulder 8' post shoulder/knee 8' post shoulder  8' post shoulder/knee 8' post shoulder/knee 8' post shoulder/knee     MHP                  "

## 2023-06-11 ENCOUNTER — HOSPITAL ENCOUNTER (OUTPATIENT)
Dept: MRI IMAGING | Facility: HOSPITAL | Age: 48
Discharge: HOME | End: 2023-06-11
Attending: ORTHOPAEDIC SURGERY
Payer: OTHER MISCELLANEOUS

## 2023-06-11 DIAGNOSIS — M25.871 MASS OF JOINT OF RIGHT FOOT: ICD-10-CM

## 2023-06-11 PROCEDURE — 73721 MRI JNT OF LWR EXTRE W/O DYE: CPT

## 2023-06-11 PROCEDURE — G1004 CDSM NDSC: HCPCS

## 2023-06-13 ENCOUNTER — OFFICE VISIT (OUTPATIENT)
Dept: PHYSICAL THERAPY | Facility: CLINIC | Age: 48
End: 2023-06-13
Payer: OTHER MISCELLANEOUS

## 2023-06-13 DIAGNOSIS — S83.412A SPRAIN OF MEDIAL COLLATERAL LIGAMENT OF LEFT KNEE, INITIAL ENCOUNTER: Primary | ICD-10-CM

## 2023-06-13 DIAGNOSIS — M25.512 ACUTE PAIN OF LEFT SHOULDER: ICD-10-CM

## 2023-06-13 DIAGNOSIS — M19.019 AC JOINT ARTHROPATHY: ICD-10-CM

## 2023-06-13 PROCEDURE — 97010 HOT OR COLD PACKS THERAPY: CPT

## 2023-06-13 PROCEDURE — 97110 THERAPEUTIC EXERCISES: CPT

## 2023-06-13 PROCEDURE — 97112 NEUROMUSCULAR REEDUCATION: CPT

## 2023-06-13 NOTE — PROGRESS NOTES
Daily Note     Today's date: 2023  Patient name: Mira Avery  : 1975  MRN: [id number]  Referring provider: Tamika Lin MD  Dx:   Encounter Diagnosis     ICD-10-CM    1  Sprain of medial collateral ligament of left knee, initial encounter  S83 412A       2  Acute pain of left shoulder  M25 512       3  AC joint arthropathy  M19 019           Start Time:   Stop Time: 930  Total time in clinic (min): 55 minutes    Subjective: Pt stated that her shoulder has been feeling a lot better since receiving her injection, but her knee is feeling a little tight today  Objective: See treatment diary below      Assessment: Pt showed considerable improvement in ability to perform warm up on pulleys with observable improvement in L shoulder flexion ROM  Pt also showed good improvement in L quad activation during knee extension activities and would benefit from continued progression of activities  Pt tolerated progression of L hamstrings stretching activity with reported decrease in stiffness post stretch  Pt tolerated progression of resistance of shoulder strengthening exercises with mild fatigue and would benefit from gradual progression  Pt tolerated application of ice at end of session well with reported decrease in discomfort post modality  Pt would benefit from continued skilled PT to improve L knee strength, L shoulder strength, ROM, mobility, flexibility, and functional ability  Plan: Continue per plan of care  Progress treatment as tolerated  Precautions:  Worker's comp    Date     Visit # 61 76 36 15 41 15 55 44 18    FTVR             Re-eval               Manuals     L knee PROM     DK  DK       L shoulder PROM  DK DK DK DK  DK       LLE STM                          Neuro Re-Ed     No monies 2x10 GTB 2x10 PTB 2x10 PTB 2x10 PTB 2x10 PTB 2x10 PTB "  Quad sets             bridges             clamshells             SLS             Unilateral stretch @ door way 5x15\" BUE 5x15\" BUE 5x15\" BUE 5x20\" BUE 5x20\" BUE        Table slides   10x5\" flex 10x5\" flex         Ball circles 30x f/b GMB     30x f/b RMB       Supine knee ext w leg hanging             SAQ 20x5\"     20x5\" 20x5\" 2# 20x5\" 2# 20x5\" 2 5#    LAQ 20x3\"      20x5\" 2# 20x5\" 2# 20x5\" 2 5#    Push up plus             Ther Ex 5/5 5/11 5/12 5/16 5/25 5/30 6/1 6/8 6/13    Bike        6'     UBE/pulleys 5' pull 4' pull  5' pull 5' pull  4' pull np 5' pull    Leg press       3x10 75# LLE 3x10 75# LLE 2x15 185# BLE, 2x15 125# LLE    Finger ladder 10x10\" abd 10x10\" abd   10x10\" abd        Wall slides             Shoulder raises 2x10 2# flex, 1# abd    10x 3# flex, 2# abd    2x10 5# flex,     Shoulder AAROM  2x10 5\" sup baton 2x10 5\" stand abd 2x10 supine baton 10x stand 3# baton 15x sup baton       scap punches     15x 3#        Rows/exts             S/l ER 2x10 2#            ER/IR  2x10 2# ER 25x 2 5# ER  2x10 3# ER 2x15 BTB ER       sidestepping             S/l hip abd             UT str              Hamstring str       5x20\" supine 5x20\" supine 5x30\" sup    Prone LLE hang             TKE      2x15 12#  2x15 15# 2x15 15#     Seated knee ext hold       2x20 5\" chair with quad sets 2x20 5\" chair with quad sets 2x20 5\" w quad set    Ther Activity     5/25 6/13    squats             Step ups     15x 2R, 15x 3R L up    20x 2R L up and over    Gait Training                                       Modalities 5/5 5/11 5/12 5/16 5/25 5/30 6/1 6/8 6/13    ice 8' ice post  8; post shoulder 8' post shoulder 8' post shoulder/knee 8' post shoulder  8' post shoulder/knee 8' post shoulder/knee 8' post shoulder/knee     MHP                  "

## 2023-06-15 ENCOUNTER — OFFICE VISIT (OUTPATIENT)
Dept: PHYSICAL THERAPY | Facility: CLINIC | Age: 48
End: 2023-06-15
Payer: OTHER MISCELLANEOUS

## 2023-06-15 DIAGNOSIS — M19.019 AC JOINT ARTHROPATHY: ICD-10-CM

## 2023-06-15 DIAGNOSIS — S83.412A SPRAIN OF MEDIAL COLLATERAL LIGAMENT OF LEFT KNEE, INITIAL ENCOUNTER: Primary | ICD-10-CM

## 2023-06-15 DIAGNOSIS — M25.512 ACUTE PAIN OF LEFT SHOULDER: ICD-10-CM

## 2023-06-15 PROCEDURE — 97110 THERAPEUTIC EXERCISES: CPT

## 2023-06-15 PROCEDURE — 97112 NEUROMUSCULAR REEDUCATION: CPT

## 2023-06-15 NOTE — PROGRESS NOTES
"Daily Note     Today's date: 6/15/2023  Patient name: Mathew Anton  : 1975  MRN: [id number]  Referring provider: Raffy Dyson MD  Dx:   Encounter Diagnosis     ICD-10-CM    1  Sprain of medial collateral ligament of left knee, initial encounter  S83 412A       2  Acute pain of left shoulder  M25 512       3  AC joint arthropathy  M19 019                      Subjective: Patient reports complaints of stiffness in left knee  States that she received results to MRI of right ankle  Objective: See treatment diary below      Assessment: Patient tolerated treatment well  Good form and understanding of exercises performed  Pt will benefit from continued skilled PT intervention in order to address remaining limitations and to restore maximal function  No increased pain reported post treatment  Plan: Continue per plan of care  Precautions:  Worker's comp    Date 5/5 5/11 5/12 5/16 5/25 5/30 6/1 6/8 6/13 6/15   Visit # 84 82 27 02 22 95 69 70 80    AMKG          nv   Re-eval               Manuals 5/5 5/11 5/12 5/16 5/25 5/30 6/1 6/8 6/13 6/15   L knee PROM     DK  DK       L shoulder PROM  DK DK DK DK  DK       LLE STM                          Neuro Re-Ed 5/5 5/11 5/12 5/16 5/25 5/30 6/1 6/8 6/13 6/15   No monies 2x10 GTB 2x10 PTB 2x10 PTB 2x10 PTB 2x10 PTB 2x10 PTB       Quad sets             bridges             clamshells             SLS             Unilateral stretch @ door way 5x15\" BUE 5x15\" BUE 5x15\" BUE 5x20\" BUE 5x20\" BUE        Table slides   10x5\" flex 10x5\" flex         Ball circles 30x f/b GMB     30x f/b RMB       Supine knee ext w leg hanging             SAQ 20x5\"     20x5\" 20x5\" 2# 20x5\" 2# 20x5\" 2 5# 20x5\" 2 5#   LAQ 20x3\"      20x5\" 2# 20x5\" 2# 20x5\" 2 5# 20x5\" 2 5#   Push up plus             Ther Ex 5/5 5/11 5/12 5/16 5/25 5/30 6/1 6/8 6/13 6/15   Bike        6'  6'   UBE/pulleys 5' pull 4' pull  5' pull 5' pull  4' pull np 5' pull    Leg press       3x10 75# LLE 3x10 " "75# LLE 2x15 185# BLE, 2x15 125# LLE 2x15 185# BLE, 2x15 125# LLE   Finger ladder 10x10\" abd 10x10\" abd   10x10\" abd        Wall slides             Shoulder raises 2x10 2# flex, 1# abd    10x 3# flex, 2# abd    2x10 5# flex,     Shoulder AAROM  2x10 5\" sup baton 2x10 5\" stand abd 2x10 supine baton 10x stand 3# baton 15x sup baton       scap punches     15x 3#        Rows/exts             S/l ER 2x10 2#            ER/IR  2x10 2# ER 25x 2 5# ER  2x10 3# ER 2x15 BTB ER       sidestepping             S/l hip abd             UT str              Hamstring str       5x20\" supine 5x20\" supine 5x30\" sup 30\"x5 supine   Prone LLE hang             TKE      2x15 12#  2x15 15# 2x15 15#  15# 2x15   Seated knee ext hold       2x20 5\" chair with quad sets 2x20 5\" chair with quad sets 2x20 5\" w quad set 2x20 5\" w quad set   Ther Activity     5/25    6/13 6/15   squats             Step ups     15x 2R, 15x 3R L up    20x 2R L up and over 20x 2R L up and over   Gait Training                                       Modalities 5/5 5/11 5/12 5/16 5/25 5/30 6/1 6/8 6/13 6/15   ice 8' ice post  8; post shoulder 8' post shoulder 8' post shoulder/knee 8' post shoulder  8' post shoulder/knee 8' post shoulder/knee 8' post shoulder/knee  8' post left shoulder and knee   MHP                  "

## 2023-06-22 ENCOUNTER — OFFICE VISIT (OUTPATIENT)
Dept: PHYSICAL THERAPY | Facility: CLINIC | Age: 48
End: 2023-06-22
Payer: OTHER MISCELLANEOUS

## 2023-06-22 DIAGNOSIS — M25.512 ACUTE PAIN OF LEFT SHOULDER: ICD-10-CM

## 2023-06-22 DIAGNOSIS — M19.019 AC JOINT ARTHROPATHY: ICD-10-CM

## 2023-06-22 DIAGNOSIS — S83.412A SPRAIN OF MEDIAL COLLATERAL LIGAMENT OF LEFT KNEE, INITIAL ENCOUNTER: Primary | ICD-10-CM

## 2023-06-22 PROCEDURE — 97112 NEUROMUSCULAR REEDUCATION: CPT

## 2023-06-22 PROCEDURE — 97110 THERAPEUTIC EXERCISES: CPT

## 2023-06-22 NOTE — PROGRESS NOTES
"Daily Note     Today's date: 2023  Patient name: Rosa Zavala  : 1975  MRN: [id number]  Referring provider: Brandon Fox MD  Dx:   Encounter Diagnosis     ICD-10-CM    1  Sprain of medial collateral ligament of left knee, initial encounter  S83 412A       2  Acute pain of left shoulder  M25 512       3  AC joint arthropathy  M19 019           Start Time: 4544  Stop Time: 1610  Total time in clinic (min): 40 minutes    Subjective: Pt stated that she slept on her shoulder wrong last night and it is feeling a little stiff but is overall pretty good, knee is feeling a little stiff  Objective: See treatment diary below      Assessment: Pt demonstrated significant improvement in L hamstring flexibility and L knee extension ROM  Pt also showed improvement in exercise tolerance with knee extension exercises  Pt also showed good improvement in BLE strength with leg press activity and would benefit from continued progression of resistance  Pt was challenged appropriately with resisted shoulder extension and rows at [employer] and would benefit from continued addition of shoulder strengthening exercises  Pt had mild difficulty with addition of push up plus activity but was able to perform well after demonstration  Pt tolerated application of ice at end of session well with reported decrease in discomfort post modality  Pt would benefit from continued skilled PT to improve LLE strength, LUE strength, mobility, ROM, and functional ability  Plan: Continue per plan of care  Progress treatment as tolerated  Precautions:  Worker's comp    Date 6/22     5/30 6/1 6/8 6/13 6/15   Visit # 21     16 17 18 19    FOTO          nv   Re-eval               Manuals 6/22     5/30 6/1 6/8 6/13 6/15   L knee PROM       DK       L shoulder PROM       DK       LLE STM                          Neuro Re-Ed 6/22     5/30 6/1 6/8 6/13 6/15   No monies      2x10 PTB       Quad sets             bridges 20x5\"        " "    belinda             SLS             Unilateral stretch @ door way 5x20\" b/l            Table slides             Ball circles      30x f/b RMB       Supine knee ext w leg hanging             SAQ 20x5\" 3#     20x5\" 20x5\" 2# 20x5\" 2# 20x5\" 2 5# 20x5\" 2 5#   LAQ 20x3\" 3#      20x5\" 2# 20x5\" 2# 20x5\" 2 5# 20x5\" 2 5#   Push up plus 15x            Ther Ex 6/22     5/30 6/1 6/8 6/13 6/15   Bike 6'       6'  6'   UBE/pulleys       4' pull np 5' pull    Leg press 2x10 155# LLE, 2x15 265# BLE      3x10 75# LLE 3x10 75# LLE 2x15 185# BLE, 2x15 125# LLE 2x15 185# BLE, 2x15 125# LLE   Finger ladder             Wall slides             Shoulder raises         2x10 5# flex,     Shoulder AAROM      15x sup baton       scap punches             Rows/exts 2x10 10#             S/l ER             ER/IR      2x15 BTB ER       sidestepping             S/l hip abd             UT str              Hamstring str 4x30\"      5x20\" supine 5x20\" supine 5x30\" sup 30\"x5 supine   Prone LLE hang             TKE       2x15 15# 2x15 15#  15# 2x15   Seated knee ext hold       2x20 5\" chair with quad sets 2x20 5\" chair with quad sets 2x20 5\" w quad set 2x20 5\" w quad set   Ther Activity 6/22        6/13 6/15   squats             Step ups         20x 2R L up and over 20x 2R L up and over   Gait Training                                       Modalities      5/30 6/1 6/8 6/13 6/15   ice 8' post shoulder/knee     8' post shoulder/knee 8' post shoulder/knee 8' post shoulder/knee  8' post left shoulder and knee   MHP                  "

## 2023-06-27 ENCOUNTER — OFFICE VISIT (OUTPATIENT)
Dept: PHYSICAL THERAPY | Facility: CLINIC | Age: 48
End: 2023-06-27
Payer: OTHER MISCELLANEOUS

## 2023-06-27 DIAGNOSIS — M25.512 ACUTE PAIN OF LEFT SHOULDER: ICD-10-CM

## 2023-06-27 DIAGNOSIS — S83.412A SPRAIN OF MEDIAL COLLATERAL LIGAMENT OF LEFT KNEE, INITIAL ENCOUNTER: Primary | ICD-10-CM

## 2023-06-27 DIAGNOSIS — M19.019 AC JOINT ARTHROPATHY: ICD-10-CM

## 2023-06-27 PROCEDURE — 97164 PT RE-EVAL EST PLAN CARE: CPT

## 2023-06-27 PROCEDURE — 97110 THERAPEUTIC EXERCISES: CPT

## 2023-06-27 NOTE — PROGRESS NOTES
Re-evaluation      Today's date: 2023  Patient name: Rosa Zavala  : 1975  MRN: [id number]  Referring provider: Brandon Fox MD  Dx:   Encounter Diagnosis     ICD-10-CM    1  Sprain of medial collateral ligament of left knee, initial encounter  S83 412A       2  Acute pain of left shoulder  M25 512       3  AC joint arthropathy  M19 019           Start Time: 830  Stop Time: 930  Total time in clinic (min): 60 minutes    Subjective: Pt stated that she is feeling pretty good overall, pt stated she walked a lot of hills over the weekend and it did not bother her knee that much  Objective: See treatment diary below    Left Shoulder   Flexion: 170 degrees  Abduction: 165 degrees  External rotation 45°: 75 degrees   Internal rotation 45°: 90 degrees     Right Shoulder   Flexion: 175 degrees   Abduction: 170 degrees   External rotation 45°: 80 degrees   Internal rotation 45°: 90 degrees     Left Knee   Flexion: 130 degrees   Extension: 0 degrees      Right Knee   Flexion: 140 degrees   Extension: 0 degrees      Strength/Myotome Testing     Left Shoulder      Planes of Motion   Flexion: 4   Abduction: 4+   External rotation at 0°: 4   Internal rotation at 0°: 4+     Right Shoulder      Planes of Motion   Flexion: 5   Abduction: 5   External rotation at 0°: 4+   Internal rotation at 0°: 5      Left Knee   Flexion: 5  Extension: 5  Quadriceps contraction: good     Right Knee   Flexion: 5  Extension: 5  Quadriceps contraction: good       Assessment: Pt LUE and LLE strength and ROM was reassessed during session  Pt showed significant improvements in L quad activation, knee strength, and knee extension ROM but continues to have slightly decreased L knee extension ROM compared to RLE, but is mainly due to L hamstrings tightness   Pt also showed significant improvement in L shoulder ROM and strength since IE but continues to be slightly limited in L shoulder flexion and abduction strength compared to RUE "and reported feeling shaky with L shoulder ER MMT but still had improved strength  Pt showed good improvement in BLE strength with progression of resistance on leg press machine, achieving maximum possible weight on leg press machine  Pt session continued to focus on L knee extension strengthening and mobility activities as well as L shoulder strengthening activities, which pt tolerated well  Pt tolerated application of ice at end of session well with reported decrease in discomfort post modality  Pt would benefit from continued skilled PT to improve LLE strength, mobility, ROM, LUE strength, mobility, and functional ability  Plan: Continue per plan of care  Progress treatment as tolerated  Precautions:  Worker's comp    Date 6/22 6/27    5/30 6/1 6/8 6/13 6/15   Visit # 21 25    16 16 18 23    FOTO done (D/cd from [facility])         nv   Re-eval               Manuals 6/22 6/27    5/30 6/1 6/8 6/13 6/15   L knee PROM       DK       L shoulder PROM       DK       LLE STM                          Neuro Re-Ed 6/22 6/27    5/30 6/1 6/8 6/13 6/15   No monies      2x10 PTB       Quad sets             bridges 20x5\"            clamshells             SLS             Unilateral stretch @ door way 5x20\" b/l 5x20\" b/l           Table slides             Ball circles      30x f/b RMB       Supine knee ext w leg hanging             SAQ 20x5\" 3# 20x5\" 4#    20x5\" 20x5\" 2# 20x5\" 2# 20x5\" 2 5# 20x5\" 2 5#   LAQ 20x3\" 3# 20x5\" 4#     20x5\" 2# 20x5\" 2# 20x5\" 2 5# 20x5\" 2 5#   Push up plus 15x            Ther Ex 6/22 6/27    5/30 6/1 6/8 6/13 6/15   Bike 6' 6'      6'  6'   UBE/pulleys       4' pull np 5' pull    Leg press 2x10 155# LLE, 2x15 265# BLE 2x10 315# BLE, 2x15 225# LLE     3x10 75# LLE 3x10 75# LLE 2x15 185# BLE, 2x15 125# LLE 2x15 185# BLE, 2x15 125# LLE   Finger ladder             Wall slides             Shoulder raises  2x10 5# flex, abd       2x10 5# flex,     Shoulder AAROM      15x sup baton       scap punches    " "         Rows/exts 2x10 10#  2x10 15# ea           S/l ER             ER/IR      2x15 BTB ER       sidestepping             S/l hip abd             UT str              Hamstring str 4x30\" 5x30\"     5x20\" supine 5x20\" supine 5x30\" sup 30\"x5 supine   Prone LLE hang             TKE       2x15 15# 2x15 15#  15# 2x15   Seated knee ext hold       2x20 5\" chair with quad sets 2x20 5\" chair with quad sets 2x20 5\" w quad set 2x20 5\" w quad set   Ther Activity 6/22        6/13 6/15   squats             Step ups         20x 2R L up and over 20x 2R L up and over   Gait Training                                       Modalities  /27    5/30 6/1 6/8 6/13 6/15   ice 8' post shoulder/knee 8' post shoulder/knee    8' post shoulder/knee 8' post shoulder/knee 8' post shoulder/knee  8' post left shoulder and knee   MHP                  "

## 2023-06-29 ENCOUNTER — OFFICE VISIT (OUTPATIENT)
Dept: PHYSICAL THERAPY | Facility: CLINIC | Age: 48
End: 2023-06-29
Payer: OTHER MISCELLANEOUS

## 2023-06-29 DIAGNOSIS — M25.512 ACUTE PAIN OF LEFT SHOULDER: ICD-10-CM

## 2023-06-29 DIAGNOSIS — M19.019 AC JOINT ARTHROPATHY: ICD-10-CM

## 2023-06-29 DIAGNOSIS — S83.412A SPRAIN OF MEDIAL COLLATERAL LIGAMENT OF LEFT KNEE, INITIAL ENCOUNTER: Primary | ICD-10-CM

## 2023-06-29 PROCEDURE — 97010 HOT OR COLD PACKS THERAPY: CPT

## 2023-06-29 PROCEDURE — 97112 NEUROMUSCULAR REEDUCATION: CPT

## 2023-06-29 PROCEDURE — 97110 THERAPEUTIC EXERCISES: CPT

## 2023-06-29 PROCEDURE — 97140 MANUAL THERAPY 1/> REGIONS: CPT

## 2023-06-29 NOTE — PROGRESS NOTES
"Daily Note     Today's date: 2023  Patient name: Maribeth Roberson  : 1975  MRN: [id number]  Referring provider: Gaston Pelletier MD  Dx:   Encounter Diagnosis     ICD-10-CM    1  Sprain of medial collateral ligament of left knee, initial encounter  S83 412A       2  Acute pain of left shoulder  M25 512       3  AC joint arthropathy  M19 019           Start Time: 06  Stop Time: 930  Total time in clinic (min): 55 minutes    Subjective: Pt stated that her shoulder has been stiff and sore today due to sleeping on it incorrectly last night  Objective: See treatment diary below      Assessment: Pt session focused primarily on L shoulder and scapular mobility activities with gentle strengthening exercises due to complaints of shoulder tighntess  Pt had mild discomfort throughout session in L AC joint during strengthening and mobility activities but was still able to complete full sets of repetitions with modification of resistance  Pt tolerated manual L UE PROM with UT STM with reported decrease in stiffness post manual treatment  Pt tolerated application of ice at end of session well with reported decrease in discomfort post modality  Pt would benefit from continued skilled PT to improve L shoulder strength, ROM, mobility, LLE strength, quad activation, mobility, and functional ability  Plan: Continue per plan of care  Progress treatment as tolerated  Precautions:  Worker's comp    Date 6/22 6/27 6/29   5/30 6/1 6/8 6/13 6/15   Visit # 41 17 24   02 70 02 83    FOTO done (D/cd from [address] [place] [address])         nv   Re-eval               Manuals 6/22 6/27 6/29   5/30 6/1 6/8 6/13 6/15   L knee PROM       DK       L shoulder PROM    DK   DK       LLE STM                          Neuro Re-Ed 6/22 6/27 6/29   5/30 6/1 6/8 6/13 6/15   No monies   2x10 GTB   2x10 PTB       Quad sets             bridges 20x5\"            clamshells             SLS             Unilateral stretch @ door way 5x20\" b/l 5x20\" b/l " "5x20\" b/l          Table slides             Ball circles      30x f/b RMB       Cross body stretch   3x20\" LUE          SAQ 20x5\" 3# 20x5\" 4#    20x5\" 20x5\" 2# 20x5\" 2# 20x5\" 2 5# 20x5\" 2 5#   LAQ 20x3\" 3# 20x5\" 4#     20x5\" 2# 20x5\" 2# 20x5\" 2 5# 20x5\" 2 5#   Push up plus 15x  2x10          Ther Ex 6/22 6/27 6/29   5/30 6/1 6/8 6/13 6/15   Bike 6' 6'      6'  6'   UBE/pulleys   5' pull    4' pull np 5' pull    Leg press 2x10 155# LLE, 2x15 265# BLE 2x10 315# BLE, 2x15 225# LLE     3x10 75# LLE 3x10 75# LLE 2x15 185# BLE, 2x15 125# LLE 2x15 185# BLE, 2x15 125# LLE   Finger ladder   10x10\" abd          Wall slides             Shoulder raises  2x10 5# flex, abd 2x10 4# flex, abd LUE      2x10 5# flex,     Shoulder AAROM      15x sup baton       scap punches             Rows/exts 2x10 10#  2x10 15# ea 2x10 10# ea          S/l ER             ER/IR      2x15 BTB ER       sidestepping             S/l hip abd             UT str              Hamstring str 4x30\" 5x30\"     5x20\" supine 5x20\" supine 5x30\" sup 30\"x5 supine   Prone LLE hang             TKE       2x15 15# 2x15 15#  15# 2x15   Seated knee ext hold       2x20 5\" chair with quad sets 2x20 5\" chair with quad sets 2x20 5\" w quad set 2x20 5\" w quad set   Ther Activity 6/22        6/13 6/15   squats             Step ups         20x 2R L up and over 20x 2R L up and over   Gait Training                                       Modalities  6/27 6/29   5/30 6/1 6/8 6/13 6/15   ice 8' post shoulder/knee 8' post shoulder/knee 10' post shoulder   8' post shoulder/knee 8' post shoulder/knee 8' post shoulder/knee  8' post left shoulder and knee   MHP                  "

## 2023-07-05 ENCOUNTER — APPOINTMENT (OUTPATIENT)
Dept: PHYSICAL THERAPY | Facility: CLINIC | Age: 48
End: 2023-07-05
Payer: OTHER MISCELLANEOUS

## 2023-07-07 ENCOUNTER — OFFICE VISIT (OUTPATIENT)
Dept: PHYSICAL THERAPY | Facility: CLINIC | Age: 48
End: 2023-07-07
Payer: OTHER MISCELLANEOUS

## 2023-07-07 DIAGNOSIS — S83.412A SPRAIN OF MEDIAL COLLATERAL LIGAMENT OF LEFT KNEE, INITIAL ENCOUNTER: Primary | ICD-10-CM

## 2023-07-07 DIAGNOSIS — M19.019 AC JOINT ARTHROPATHY: ICD-10-CM

## 2023-07-07 DIAGNOSIS — M25.512 ACUTE PAIN OF LEFT SHOULDER: ICD-10-CM

## 2023-07-07 PROCEDURE — 97110 THERAPEUTIC EXERCISES: CPT

## 2023-07-07 PROCEDURE — 97112 NEUROMUSCULAR REEDUCATION: CPT

## 2023-07-07 PROCEDURE — 97010 HOT OR COLD PACKS THERAPY: CPT

## 2023-07-07 NOTE — PROGRESS NOTES
Daily Note     Today's date: 2023  Patient name: Cheryl Sheffield  : 1975  MRN: [id number]  Referring provider: Britt Goldstein MD  Dx:   Encounter Diagnosis     ICD-10-CM    1. Sprain of medial collateral ligament of left knee, initial encounter  S83.412A       2. Acute pain of left shoulder  M25.512       3. AC joint arthropathy  M19.019           Start Time: 5272  Stop Time: 1445  Total time in clinic (min): 50 minutes    Subjective: Pt stated that she has her appointment with her doctor on Monday to see if she is fully cleared to return to her new job. Pt stated that her L shoulder still stiffens up at times but is still feeling much better over the past couple weeks. Pt stated that her knee has not been bothering her at all and feels strong. Objective: See treatment diary below      Assessment: Discussed with pt at beginning of session that we will have one more session where we will go over updated HEP one more time to ensure proper performance and discuss what was said at doctor's visit, pt understood and agreed. Also discussed pt's insurance and the possibility of work comp claim being closed on Monday and that today may be the last visit, pt understood and will discuss with doctor Monday. Pt performed warm up on pulleys well with reported decrease in stiffness post activity. Pt continues to show good improvement in L knee strength and quad activation, being able to perform same amount of resistance with the LLE compared to the RLE on leg press. Pt was given updated HEP today in case it is her last session but was instructed to practice it at home and come in next time with any questions on proper form during exercise. Pt tolerated application of ice at end of session well with reported decrease in discomfort post modality.  Pt would benefit from continued skilled PT to improve L shoulder strength, mobility, ROM, L knee strength, quad activation, ROM, flexibility, and functional ability. Plan: Continue per plan of care. Potential discharge next visit. Progress treatment as tolerated. Precautions:  Worker's comp    Date 6/22 6/27 6/29 7/7     6/13 6/15   Visit # 21 22 23 24     23    FOTO done (D/cd from [address] [place] [address])         nv   Re-eval               Manuals 6/22 6/27 6/29 7/7     6/13 6/15   L knee PROM              L shoulder PROM    DK          LLE STM                          Neuro Re-Ed 6/22 6/27 6/29 7/7     6/13 6/15   No monies   2x10 GTB 2x10 purple          Quad sets    HEP         bridges 20x5"   HEP         clamshells             SLS             Unilateral stretch @ door way 5x20" b/l 5x20" b/l 5x20" b/l HEP         Table slides             Ball circles             Cross body stretch   3x20" LUE          SAQ 20x5" 3# 20x5" 4#  HEP     20x5" 2.5# 20x5" 2.5#   LAQ 20x3" 3# 20x5" 4#  HEP     20x5" 2.5# 20x5" 2.5#   Push up plus 15x  2x10 HEP         Ther Ex 6/22 6/27 6/29 7/7     6/13 6/15   Bike 6' 6'        6'   UBE/pulleys   5' pull 5' pull     5' pull    Leg press 2x10 155# LLE, 2x15 265# BLE 2x10 315# BLE, 2x15 225# LLE  2x15 305# BLE,      2x15 185# BLE, 2x15 125# LLE 2x15 185# BLE, 2x15 125# LLE   Finger ladder   10x10" abd          Wall slides    HEP         Shoulder raises  2x10 5# flex, abd 2x10 4# flex, abd LUE HEP     2x10 5# flex,     Shoulder AAROM             scap punches             Rows/exts 2x10 10#  2x10 15# ea 2x10 10# ea 2x10 20#/15#         S/l ER             ER/IR    HEP         sidestepping             S/l hip abd             UT str              Hamstring str 4x30" 5x30"       5x30" sup 30"x5 supine   Prone LLE hang             slantboard str    3x20" ea         TKE          15# 2x15   Seated knee ext hold         2x20 5" w quad set 2x20 5" w quad set   Ther Activity 6/22   7/7     6/13 6/15   squats             Step ups         20x 2R L up and over 20x 2R L up and over   Gait Training                                       Modalities  6/27 6/29 7/7 6/13 6/15   ice 8' post shoulder/knee 8' post shoulder/knee 10' post shoulder 10' post shoulder, knee      8' post left shoulder and knee   MHP

## 2023-07-10 ENCOUNTER — OFFICE VISIT (OUTPATIENT)
Dept: OBGYN CLINIC | Facility: HOSPITAL | Age: 48
End: 2023-07-10
Payer: OTHER MISCELLANEOUS

## 2023-07-10 VITALS
BODY MASS INDEX: 39.79 KG/M2 | DIASTOLIC BLOOD PRESSURE: 81 MMHG | WEIGHT: 238.8 LBS | HEIGHT: 65 IN | SYSTOLIC BLOOD PRESSURE: 114 MMHG | HEART RATE: 101 BPM

## 2023-07-10 DIAGNOSIS — M19.012 ARTHRITIS OF LEFT ACROMIOCLAVICULAR JOINT: Primary | ICD-10-CM

## 2023-07-10 DIAGNOSIS — S83.412D SPRAIN OF MEDIAL COLLATERAL LIGAMENT OF LEFT KNEE, SUBSEQUENT ENCOUNTER: ICD-10-CM

## 2023-07-10 PROCEDURE — 99213 OFFICE O/P EST LOW 20 MIN: CPT | Performed by: ORTHOPAEDIC SURGERY

## 2023-07-10 NOTE — PROGRESS NOTES
Assessment/Plan:    Arthritis of right acromioclavicular joint  We will continue her in physical therapy. We will return her to work on 15 July. We will see her back in a month if she is having any issues. Otherwise she can be at maximum medical improvement. Diagnoses and all orders for this visit:    Arthritis of left acromioclavicular joint    Sprain of medial collateral ligament of left knee, subsequent encounter          Subjective:      Patient ID: Carrie Tay is a 53 y.o. female. This is a 50-year-old woman who has this ongoing left shoulder and left knee issues. She has been in physical therapy. She is gotten a lot better. They actually changed her job and she feels this will be a lot more accommodated to her. She like to finish physical therapy and return to work on 15 July. The following portions of the patient's history were reviewed and updated as appropriate: allergies, current medications, past family history, past medical history, past social history, past surgical history, and problem list.    Review of Systems      Objective:      /81   Pulse 101   Ht 5' 5" (1.651 m)   Wt 108 kg (238 lb 12.8 oz)   BMI 39.74 kg/m²          Physical Exam      As far as the shoulder is concerned she continues to have tenderness over the left acromioclavicular joint. She has good range of motion of the knee with no signs of any problems there.

## 2023-07-10 NOTE — ASSESSMENT & PLAN NOTE
We will continue her in physical therapy. We will return her to work on 15 July. We will see her back in a month if she is having any issues. Otherwise she can be at maximum medical improvement.

## 2023-07-11 ENCOUNTER — OFFICE VISIT (OUTPATIENT)
Dept: PHYSICAL THERAPY | Facility: CLINIC | Age: 48
End: 2023-07-11
Payer: OTHER MISCELLANEOUS

## 2023-07-11 DIAGNOSIS — M25.512 ACUTE PAIN OF LEFT SHOULDER: ICD-10-CM

## 2023-07-11 DIAGNOSIS — S83.412A SPRAIN OF MEDIAL COLLATERAL LIGAMENT OF LEFT KNEE, INITIAL ENCOUNTER: Primary | ICD-10-CM

## 2023-07-11 DIAGNOSIS — M19.019 AC JOINT ARTHROPATHY: ICD-10-CM

## 2023-07-11 PROCEDURE — 97010 HOT OR COLD PACKS THERAPY: CPT

## 2023-07-11 PROCEDURE — 97112 NEUROMUSCULAR REEDUCATION: CPT

## 2023-07-11 NOTE — PROGRESS NOTES
Discharge Note     Today's date: 2023  Patient name: Sushila Anaya  : 1975  MRN: [id number]  Referring provider: Danihs Ambrocio MD  Dx:   Encounter Diagnosis     ICD-10-CM    1. Sprain of medial collateral ligament of left knee, initial encounter  S83.412A       2. Acute pain of left shoulder  M25.512       3. AC joint arthropathy  M19.019           Start Time: 0815  Stop Time: 0840  Total time in clinic (min): 25 minutes    Subjective: Pt stated that she was cleared for full duty at her job and will be returning on . Pt stated that she would like today to be her last day of formal physical therapy. Pt reported that her L shoulder is stiff today but does not have any pain at rest.      Objective: See treatment diary below      Assessment: Pt tolerated warm up on pulleys well without increase in discomfort during or after activity. Pt continues to show good improvement in L shoulder strength as seen with progression of resistance tolerated with shoulder flexion and abduction raises. Pt was re educated on exercises on HEP during session, pt demonstrated good form with performance. Pt tolerated application of ice at end of session well with reported decrease in discomfort post modality. Pt was instructed to give the clinic a call if her insurance claim stays open and she wants to continue formal PT sessions. Pt was discharged today. Goals  ST. Pt will be independent with HEP. Met ()  2. Pt will improve L shoulder flexion ROM by 15 degrees. Met ()  3. Pt will improve L knee flexion ROM by 15 degrees. Met ()  4. Pt will improve L quad activation to good. Met ()  5. Pt will improve L shoulder pain at rest to 4/10. Met ()  LT. Pt will improve L shoulder pain at rest to 0/10. Met ()  2. Pt will demonstrate L shoulder ROM WNL to improve ability to perform ADLs. Met ()  3. Pt will improve L knee extension strength to 4+/5 to improve stability with walking. Met (7/11)  4. Pt will improve L shoulder ER strength to 4/5 to improve ability to carry objects. Met (7/11)  5. Pt will improve L shoulder abduction strength to 4/5 to improve ability to lift overhead. Met (7/11)      Plan: Pt was discharged. Precautions:  Worker's comp    Date 6/22 6/27 6/29 7/7 7/11    6/13 6/15   Visit # 21 22 21 24 25    23    FOTO done (D/cd from [address] [place] [address])         nv   Re-eval               Manuals 6/22 6/27 6/29 7/7 7/11    6/13 6/15   L knee PROM              L shoulder PROM    DK          LLE STM                          Neuro Re-Ed 6/22 6/27 6/29 7/7 7/11    6/13 6/15   No monies   2x10 GTB 2x10 purple  2x10 purple        Quad sets    HEP         bridges 20x5"   HEP         clamshells             SLS             Unilateral stretch @ door way 5x20" b/l 5x20" b/l 5x20" b/l HEP         Table slides             Ball circles             Cross body stretch   3x20" LUE          SAQ 20x5" 3# 20x5" 4#  HEP     20x5" 2.5# 20x5" 2.5#   LAQ 20x3" 3# 20x5" 4#  HEP     20x5" 2.5# 20x5" 2.5#   Push up plus 15x  2x10 HEP         Ther Ex 6/22 6/27 6/29 7/7 7/11    6/13 6/15   Bike 6' 6'        6'   UBE/pulleys   5' pull 5' pull 5' pull    5' pull    Leg press 2x10 155# LLE, 2x15 265# BLE 2x10 315# BLE, 2x15 225# LLE  2x15 305# BLE,  2x15 305# BLE, 2x15 165# LLE    2x15 185# BLE, 2x15 125# LLE 2x15 185# BLE, 2x15 125# LLE   Finger ladder   10x10" abd          Wall slides    HEP         Shoulder raises  2x10 5# flex, abd 2x10 4# flex, abd LUE HEP 2x10 6# flex, abd LUE    2x10 5# flex,     Shoulder AAROM             scap punches             Rows/exts 2x10 10#  2x10 15# ea 2x10 10# ea 2x10 20#/15# 30x 20# ea        S/l ER             ER/IR    HEP         sidestepping             S/l hip abd             UT str              Hamstring str 4x30" 5x30"       5x30" sup 30"x5 supine   Prone LLE hang             slantboard str    3x20" ea         TKE          15# 2x15   Seated knee ext hold         2x20 5" w quad set 2x20 5" w quad set   Ther Activity 6/22   7/7     6/13 6/15   squats             Step ups         20x 2R L up and over 20x 2R L up and over   Gait Training                                       Modalities  6/27 6/29 7/7 7/11    6/13 6/15   ice 8' post shoulder/knee 8' post shoulder/knee 10' post shoulder 10' post shoulder, knee 10' post L shoulder     8' post left shoulder and knee   MHP

## 2023-07-12 ENCOUNTER — TELEPHONE (OUTPATIENT)
Dept: OBGYN CLINIC | Facility: HOSPITAL | Age: 48
End: 2023-07-12

## 2023-07-12 NOTE — TELEPHONE ENCOUNTER
Caller: Hina Garcia from [employer]    Doctor: Tabitha Gabriel    Reason for call: electronically faxed 7/10 OVN   Sent to fax # [phone]

## 2024-03-13 NOTE — TELEPHONE ENCOUNTER
Caller: Mary Robles w/ [employer]      Doctor/Office: Dr Osborne     CB#: n/a       What needs to be faxed: Mildred Carrillo from 3/20/23     ATTN to: Carlos Cifuentes    Fax#: [phone]       Documents were successfully e-faxed
[TextBox_4] : S/p treatment of monilial infection with Diflucan 100mg OD x 7 and Monostat III but still has occasional slight vaginal discharge with occasional itching. with good results. Also, c/o difficulty voiding x 6 months.